# Patient Record
Sex: FEMALE | Race: BLACK OR AFRICAN AMERICAN | NOT HISPANIC OR LATINO | Employment: UNEMPLOYED | URBAN - METROPOLITAN AREA
[De-identification: names, ages, dates, MRNs, and addresses within clinical notes are randomized per-mention and may not be internally consistent; named-entity substitution may affect disease eponyms.]

---
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
## 2022-02-16 ENCOUNTER — OFFICE VISIT (OUTPATIENT)
Dept: PRIMARY CARE CLINIC | Facility: CLINIC | Age: 22
End: 2022-02-16
Payer: MEDICAID

## 2022-02-16 VITALS
RESPIRATION RATE: 18 BRPM | BODY MASS INDEX: 31.16 KG/M2 | WEIGHT: 187 LBS | HEART RATE: 65 BPM | SYSTOLIC BLOOD PRESSURE: 109 MMHG | OXYGEN SATURATION: 99 % | HEIGHT: 65 IN | DIASTOLIC BLOOD PRESSURE: 68 MMHG

## 2022-02-16 DIAGNOSIS — Z01.419 WELL WOMAN EXAM WITH ROUTINE GYNECOLOGICAL EXAM: ICD-10-CM

## 2022-02-16 DIAGNOSIS — K21.00 GASTROESOPHAGEAL REFLUX DISEASE WITH ESOPHAGITIS WITHOUT HEMORRHAGE: ICD-10-CM

## 2022-02-16 DIAGNOSIS — R35.0 URINE FREQUENCY: ICD-10-CM

## 2022-02-16 PROCEDURE — 1159F MED LIST DOCD IN RCRD: CPT | Mod: CPTII,S$GLB,, | Performed by: NURSE PRACTITIONER

## 2022-02-16 PROCEDURE — 1159F PR MEDICATION LIST DOCUMENTED IN MEDICAL RECORD: ICD-10-PCS | Mod: CPTII,S$GLB,, | Performed by: NURSE PRACTITIONER

## 2022-02-16 PROCEDURE — 3074F SYST BP LT 130 MM HG: CPT | Mod: CPTII,S$GLB,, | Performed by: NURSE PRACTITIONER

## 2022-02-16 PROCEDURE — 1160F PR REVIEW ALL MEDS BY PRESCRIBER/CLIN PHARMACIST DOCUMENTED: ICD-10-PCS | Mod: CPTII,S$GLB,, | Performed by: NURSE PRACTITIONER

## 2022-02-16 PROCEDURE — 3074F PR MOST RECENT SYSTOLIC BLOOD PRESSURE < 130 MM HG: ICD-10-PCS | Mod: CPTII,S$GLB,, | Performed by: NURSE PRACTITIONER

## 2022-02-16 PROCEDURE — 99214 OFFICE O/P EST MOD 30 MIN: CPT | Mod: S$GLB,,, | Performed by: NURSE PRACTITIONER

## 2022-02-16 PROCEDURE — 3078F PR MOST RECENT DIASTOLIC BLOOD PRESSURE < 80 MM HG: ICD-10-PCS | Mod: CPTII,S$GLB,, | Performed by: NURSE PRACTITIONER

## 2022-02-16 PROCEDURE — 3078F DIAST BP <80 MM HG: CPT | Mod: CPTII,S$GLB,, | Performed by: NURSE PRACTITIONER

## 2022-02-16 PROCEDURE — 99214 PR OFFICE/OUTPT VISIT, EST, LEVL IV, 30-39 MIN: ICD-10-PCS | Mod: S$GLB,,, | Performed by: NURSE PRACTITIONER

## 2022-02-16 PROCEDURE — 3008F BODY MASS INDEX DOCD: CPT | Mod: CPTII,S$GLB,, | Performed by: NURSE PRACTITIONER

## 2022-02-16 PROCEDURE — 3008F PR BODY MASS INDEX (BMI) DOCUMENTED: ICD-10-PCS | Mod: CPTII,S$GLB,, | Performed by: NURSE PRACTITIONER

## 2022-02-16 PROCEDURE — 1160F RVW MEDS BY RX/DR IN RCRD: CPT | Mod: CPTII,S$GLB,, | Performed by: NURSE PRACTITIONER

## 2022-02-16 NOTE — PROGRESS NOTES
Subjective:       Patient ID: Luciana Jacob is a 21 y.o. female.    Chief Complaint: Follow-up    20 y/o female in for F/U.    Had Nexplanon previously which she had removed in November by [facility] after having breast tenderness and nipple pain along with nausea. Since having it removed she is no longer having these symptoms. She is considering getting a birth control without hormones but needs a referral to see a GYN provider since she has been going to [facility] prior to now. Pregnancy test negative today in office. Requested pregnancy test today due to her having intercourse once since having her Nexplanon removed.    GERD - controlled well with Protonix daily. No longer having excessive burping or gas.     No other issues or concerns today.              History reviewed. No pertinent past medical history.    Past Surgical History:   Procedure Laterality Date    KNEE SURGERY Left 2017       Family History   Problem Relation Age of Onset    Cancer Paternal Grandmother        Social History     Tobacco Use    Smoking status: Never Smoker    Smokeless tobacco: Never Used   Substance Use Topics    Alcohol use: Not Currently    Drug use: Never       Patient Active Problem List   Diagnosis    Gastroesophageal reflux disease with esophagitis without hemorrhage       Immunization History   Administered Date(s) Administered    COVID-19, MRNA, LN-S, PF (Pfizer) (Purple Cap) 08/03/2021, 08/24/2021           Review of Systems   Constitutional: Negative for activity change, appetite change, chills, diaphoresis, fatigue and fever.   HENT: Negative for sinus pain, tinnitus and trouble swallowing.    Eyes: Negative for visual disturbance.   Respiratory: Negative for cough, chest tightness, shortness of breath and wheezing.    Cardiovascular: Negative for chest pain, palpitations and leg swelling.   Gastrointestinal: Negative for abdominal distention and abdominal pain.   Genitourinary: Negative for  "decreased urine volume, dysuria, frequency, hematuria and urgency.   Musculoskeletal: Negative for gait problem.   Skin: Negative for color change and rash.   Neurological: Negative for dizziness, syncope, weakness, light-headedness, numbness and headaches.   Psychiatric/Behavioral: Negative for behavioral problems and confusion.     Objective:     Vitals:    02/16/22 1612   BP: 109/68   BP Location: Left arm   Patient Position: Sitting   BP Method: Large (Automatic)   Pulse: 65   Resp: 18   SpO2: 99%   Weight: 84.8 kg (187 lb)   Height: 5' 5" (1.651 m)       Physical Exam  Vitals and nursing note reviewed.   Constitutional:       Appearance: Normal appearance.   HENT:      Head: Normocephalic and atraumatic.      Nose: Nose normal.      Mouth/Throat:      Mouth: Mucous membranes are moist.      Pharynx: Oropharynx is clear.   Eyes:      Extraocular Movements: Extraocular movements intact.      Conjunctiva/sclera: Conjunctivae normal.      Pupils: Pupils are equal, round, and reactive to light.   Cardiovascular:      Rate and Rhythm: Normal rate and regular rhythm.      Pulses: Normal pulses.      Heart sounds: Normal heart sounds.   Pulmonary:      Effort: Pulmonary effort is normal.      Breath sounds: Normal breath sounds.   Abdominal:      General: There is no distension.      Tenderness: There is no abdominal tenderness.   Musculoskeletal:         General: Normal range of motion.      Cervical back: Normal range of motion and neck supple.      Right lower leg: No edema.      Left lower leg: No edema.   Skin:     General: Skin is warm and dry.   Neurological:      Mental Status: She is alert and oriented to person, place, and time.   Psychiatric:         Mood and Affect: Mood and affect normal.         Behavior: Behavior normal. Behavior is cooperative.         Office Visit on 11/16/2021   Component Date Value Ref Range Status    Glucose 11/16/2021 74  65 - 139 mg/dL Final    BUN 11/16/2021 6* 7 - 20 mg/dL " Final    Creatinine 11/16/2021 0.66  0.50 - 1.00 mg/dL Final    eGFR if non  11/16/2021 128  > OR = 60 mL/min/1.73m2 Final    eGFR if  11/16/2021 148  > OR = 60 mL/min/1.73m2 Final    BUN/Creatinine Ratio 11/16/2021 9  6 - 22 (calc) Final    Sodium 11/16/2021 140  135 - 146 mmol/L Final    Potassium 11/16/2021 4.1  3.8 - 5.1 mmol/L Final    Chloride 11/16/2021 104  98 - 110 mmol/L Final    CO2 11/16/2021 31  20 - 32 mmol/L Final    Calcium 11/16/2021 9.6  8.9 - 10.4 mg/dL Final    Total Protein 11/16/2021 7.1  6.3 - 8.2 g/dL Final    Albumin 11/16/2021 4.3  3.6 - 5.1 g/dL Final    Globulin, Total 11/16/2021 2.8  2.0 - 3.8 g/dL (calc) Final    Albumin/Globulin Ratio 11/16/2021 1.5  1.0 - 2.5 (calc) Final    Total Bilirubin 11/16/2021 0.5  0.2 - 1.1 mg/dL Final    Alkaline Phosphatase 11/16/2021 71  36 - 128 U/L Final    AST 11/16/2021 15  12 - 32 U/L Final    ALT 11/16/2021 12  5 - 32 U/L Final    HCG, Total, QL 11/16/2021 NEGATIVE  See Note: Final    Color, UA 11/16/2021 YELLOW  YELLOW Final    Appearance, UA 11/16/2021 CLEAR  CLEAR Final    Specific [place], UA 11/16/2021 1.017  1.001 - 1.035 Final    pH, UA 11/16/2021 6.0  5.0 - 8.0 Final    Glucose, UA 11/16/2021 NEGATIVE  NEGATIVE Final    Bilirubin, UA 11/16/2021 NEGATIVE  NEGATIVE Final    Ketones, UA 11/16/2021 NEGATIVE  NEGATIVE Final    Occult Blood UA 11/16/2021 NEGATIVE  NEGATIVE Final    Protein, UA 11/16/2021 NEGATIVE  NEGATIVE Final    Nitrite, UA 11/16/2021 NEGATIVE  NEGATIVE Final    Leukocytes, UA 11/16/2021 1+* NEGATIVE Final    WBC Casts, UA 11/16/2021 6-10* < OR = 5 /HPF Final    RBC Casts, UA 11/16/2021 0-2  < OR = 2 /HPF Final    Squam Epithel, UA 11/16/2021 0-5  < OR = 5 /HPF Final    Bacteria, UA 11/16/2021 NONE SEEN  NONE SEEN /HPF Final    Hyaline Casts, UA 11/16/2021 NONE SEEN  NONE SEEN /LPF Final    Urine Culture, Routine 11/16/2021 *  Final         Assessment:      1.  Gastroesophageal reflux disease with esophagitis without hemorrhage    2. Urine frequency    3. Well woman exam with routine gynecological exam          Plan:     Gastroesophageal reflux disease with esophagitis without hemorrhage  Comments:  continue pantoprazole daily, avoid large meals and lying down after meals.    Urine frequency  Comments:  Pregnancy test negative today in office  Orders:  -     POCT urine pregnancy    Well woman exam with routine gynecological exam  Comments:  Referred to GYN for well woman exam  Orders:  -     Ambulatory referral/consult to Obstetrics / Gynecology; Future; Expected date: 02/23/2022         Current Outpatient Medications   Medication Sig Dispense Refill    pantoprazole (PROTONIX) 20 MG tablet Take 1 tablet (20 mg total) by mouth once daily. 30 tablet 2     No current facility-administered medications for this visit.       Medications Discontinued During This Encounter   Medication Reason    nitrofurantoin, macrocrystal-monohydrate, (MACROBID) 100 MG capsule Patient no longer taking       Health Maintenance   Topic Date Due    HPV Vaccines (1 - 2-dose series) Never done    Hepatitis C Screening  07/12/2022 (Originally 2002)    TETANUS VACCINE  07/12/2022 (Originally 9/5/2020)    Lipid Panel  Completed       Patient Instructions     RTC in 6 months for F/U or sooner if needed.    Keep appts with specialists as scheduled.    Instructed patient to report to nearest ER or call 911 if begins to have difficulty breathing, turning blue, chest pain, B/P < 80/60 or >170/100, palpitations, syncope, extreme weakness, or severe H/A. Patient verbalized understanding.     Patient Education       Birth Control Options   About this topic   Birth control is also known as contraception. It is a way for people to avoid pregnancy. There are many kinds of birth control. It is important to learn about them to decide the best one for you to use.  General   Talk to your partner and your  doctor. Together, you can choose the best kind of birth control for you. It is important to think about:  · Your health  · Protection from sexually-transmitted diseases (STDs)  · How often you have sex  · If you have more than one sex partner  · Your wish to have children in the future  There is only one way to fully avoid pregnancy. That is by not having sex. This is called abstinence. It also protects you from STDs.  What Is The Best Birth Control Method for Me?   Barrier methods: Use a block or barrier to keep sperm from reaching the egg. Condoms give you the most protection from STDs. Spermicides and other methods do not protect you from STDs.  · External condom ? Also known as a male condom. It is a thin film cover placed over the penis before sex. It is used only once.  · Internal condom - Also known as a female condom. It is a lubricated thin pouch that is placed into the vagina before sex. It may be only used once.  · Spermicide ? A foam, cream, jelly, film, or tablet that kills sperm.  · Diaphragm with spermicide ? A dome-shaped flexible disc and rim. It covers the cervix so the sperm cannot reach the egg. It is used with a spermicide and put in place before sex.  · Sponge with spermicide ? A disc-shaped device with spermicide that covers the cervix. It is put in place before sex.  · Cervical cap with spermicide ? A soft latex or silicon cup with a round rim that fits over the cervix. It is put in place before sex.  · Non-hormonal gel - Lowers the pH in the vagina and helps stop sperm from reaching the egg. It is used before sex. Must be repeated each time before you have sex.  Hormonal methods: These drugs give you hormones that keep the ovary from releasing eggs to be fertilized. This method does not protect you from STDs.  · Oral contraceptive pill ? A pill that you take each day that gives you hormones. It also thickens the cervical mucus to keep sperm from reaching the egg.  · Patch ? A weekly skin  patch that contains hormones. It is worn on your lower belly, buttocks, or upper body.  · Vaginal ring ? A flexible ring that contains hormones. You put the ring into your vagina once a month. The ring stays in place for 3 weeks and then you remove it for 1 week.  · Shot or Injection ? A shot that gives you hormones over 3 months. It also thickens the cervical mucus that keeps the sperm from reaching the egg. You must repeat the shot every 3 months to have continued protection.  Emergency contraceptive: This may be used if you did not use birth control before sex or if your normal birth control failed. It should not be used as a regular form of birth control. This method does not protect you from STDs.  · Morning after pill ? One or two pills with hormones. This works the same as other birth control pills. You can use it after you have sex without a condom or without the use of any birth control. It is also used if your birth control did not work or you are forced to have sex.  Implanted devices: These are put into the body to stop eggs from being fertilized. They can be kept in place for a few years. This method does not protect you from STDs.  · Intrauterine device (IUD) ? A T-shaped, flexible device that contains hormones. It is put into your uterus (womb) by the doctor.  · Implantable janet ? A matchstick-sized janet that contains hormones. It is put in under the skin of your upper arm.  Permanent methods: Surgery for people who do not want children in the future. If you have a uterus, you will not be able to get pregnant. If you produce sperm, you will not be able to get a person pregnant. Permanent methods do not protect you from STDs.  · Sterilization surgery:  ? Tubal ligation ? The fallopian tubes are closed off so the sperm cannot reach the egg.  ? Vasectomy ? The tube that carries the sperm is blocked. The fluid that comes out during ejaculation does not have sperm in it.  What will the results be?    Pregnancy and sexually transmitted diseases or STDs may be avoided. The option you choose will only work if you use it the right way and use it each time you have sex.  Where can I learn more?   FamilyDoctor.org  http://familydoctor.org/familydoctor/en/prevention-wellness/sex-birth-control/birth-control/birth-control-options.html   FDA Office of Women's Health  https://www.fda.gov/consumers/free-publications-women/birth-control   NHS Choices  https://www.nhs.uk/conditions/contraception/?   Last Reviewed Date   2021-07-08  Consumer Information Use and Disclaimer   This information is not specific medical advice and does not replace information you receive from your health care provider. This is only a brief summary of general information. It does NOT include all information about conditions, illnesses, injuries, tests, procedures, treatments, therapies, discharge instructions or life-style choices that may apply to you. You must talk with your health care provider for complete information about your health and treatment options. This information should not be used to decide whether or not to accept your health care providers advice, instructions or recommendations. Only your health care provider has the knowledge and training to provide advice that is right for you.  Copyright   Copyright © 2021 UpToDate, Inc. and its affiliates and/or licensors. All rights reserved.  Patient Education       STD Prevention   About this topic   Sexually-transmitted diseases or STDs are infections you catch during sexual contact. This includes vaginal, oral, and anal sex. You may also get it by coming in contact with skin, genitals, mouth, rectum, or body fluids of an infected person. A person with an STD may not have any signs or know they have it. STDs can be very serious and have long-term health effects. STDs can cause cancer, blindness, or not being able to have children.  Bacteria, viruses, or parasites can cause STDs.  Bacterial STDs are treated with antibiotics. Only the signs of viral STDs are treated. Common STDs include:  · Chlamydia   · Gonorrhea  · Syphilis  · Human immunodeficiency virus (HIV)  · Herpes simplex  · Human papillomavirus (HPV)  · Hepatitis B and C  · Trichomonas  STDs may cause signs like:  · Pain when passing urine  · Sores or genital warts  · Unusual discharge from penis or vagina  · Itching on your inner thighs, anus, or genitals  · Abnormal menstrual bleeding  · Pain or bleeding during sex  · Swelling of testicles  · Fever  · Muscle or joint pain  These signs may come and go. It is important to see your doctor even if you think the signs are gone.  General   · Learn about your health, your body, sex, love, and relationships. These are all important parts of sexual health.  · Learn about STDs. Find reliable information about STDs.  · Know the right names for both male and female body parts.  · Find information about the kinds of infections you could get.  · Know how STDs spread as well as the signs and treatment.     What will the results be?   · You may be able to protect yourself from getting STDs.  · You may be able to prevent long-term effects on your health.  · If you are pregnant, you may be able to prevent giving your unborn baby a STD.  Will there be any other care needed?   · Ask your doctor if there are shots or pills you can take to prevent a STD.  · Know your STD status. Get tested and have your partner tested.  What can be done to prevent this health problem?   · The only sure way to keep from getting or passing on a sexually-transmitted infection is to not have sexual contact with anyone.  · Even if you do not have any signs of illness, you may spread an STD.  · Having an STD can increase your chances of catching HIV, the virus that causes AIDS.  · If you have any type of sexual contact, use latex condoms each time to reduce the spread of infection. Use a condom with each partner every time,  from the start of sex to the end.  · Avoid contact with any sex partner known to have an infection or who has signs of an infection like genital sores or warts.  · Avoid multiple sex partners. A long-term monogamous relationship with a partner who has tested negative and is known to have no infection is the safest partner.  · If you are pregnant, get tested and get prompt treatment for an STD. This will help avoid passing it to your baby.  · Avoid using drugs or too much alcohol. Either of these can lead to risky behavior like unprotected sex.  · Talk to your partner about STDs before you have sex. Talk about having safe sex and if your relationship is monogamous.  · Wash your hands and genitals with soap and water after sex.  · See your doctor for regular exams and check-ups for STDs.  · Talk to your doctor about available vaccines for prevention of certain STDs.  Where can I learn more?   American Academy of Family Physicians  http://familydoctor.org/familydoctor/en/diseases-conditions/sexually-transmitted-infections/prevention.printerview.all.html   Centers for Disease Control  http://www.cdc.gov/std/prevention/default.htm   Last Reviewed Date   2021-03-31  Consumer Information Use and Disclaimer   This information is not specific medical advice and does not replace information you receive from your health care provider. This is only a brief summary of general information. It does NOT include all information about conditions, illnesses, injuries, tests, procedures, treatments, therapies, discharge instructions or life-style choices that may apply to you. You must talk with your health care provider for complete information about your health and treatment options. This information should not be used to decide whether or not to accept your health care providers advice, instructions or recommendations. Only your health care provider has the knowledge and training to provide advice that is right for you.  Copyright  "  Copyright © 2021 UpToDate, Inc. and its affiliates and/or licensors. All rights reserved.      Patient Education       Acid Reflux and Gastroesophageal Reflux Disease in Adults   The Basics   Written by the doctors and editors at [employer]   What is acid reflux? -- Acid reflux is when the acid that is normally in your stomach backs up into the esophagus. The esophagus is the tube that carries food from your mouth to your stomach (figure 1).  When acid reflux causes bothersome symptoms or damage, doctors call it "gastroesophageal reflux disease" or "GERD."  What are the symptoms of acid reflux? -- The most common symptoms are:  · Heartburn, which is a burning feeling in the chest  · Regurgitation, which is when acid and undigested food flow back into your throat or mouth   Other symptoms might include:  · Stomach or chest pain  · Trouble swallowing  · Having a raspy voice or a sore throat  · Unexplained cough  · Nausea or vomiting  Is there anything I can do on my own to feel better? -- Yes. You might feel better if you:  · Lose weight (if you are overweight)  · Raise the head of your bed by 6 to 8 inches - You can do this by putting blocks of wood or rubber under 2 legs of the bed or a foam wedge under the mattress.  · Avoid foods that make your symptoms worse - For some people these include coffee, chocolate, alcohol, peppermint, and fatty foods.  · Stop smoking, if you smoke  · Avoid late meals - Lying down with a full stomach can make reflux worse. Try to plan meals for at least 2 to 3 hours before bedtime.  · Avoid tight clothing - Some people feel better if they wear comfortable clothing that does not squeeze the stomach area.   How is acid reflux treated? -- There are a few main types of medicines that can help with the symptoms of acid reflux. The most common are antacids, histamine blockers, and proton pump inhibitors (table 1). All of these medicines work by reducing or blocking stomach acid. But they each " "do that in a different way.  · For mild symptoms, antacids can help, but they work only for a short time. Histamine blockers are stronger and last longer than antacids. You can buy antacids and most histamine blockers without a prescription.  · For frequent and more severe symptoms, proton pump inhibitors are the most effective medicines. Some of these medicines are sold without a prescription. But there are other versions that your doctor can prescribe.  Sometimes, medicines cost less if you get them with a doctor's prescription. Other times, non-prescription medicines cost less. If you are worried about cost, ask your pharmacist about ways to pay less for your medicines.  Should I see a doctor or nurse about my acid reflux? -- Some people can manage their acid reflux on their own by changing their habits or taking non-prescription medicines. But you should see a doctor or nurse if:  · Your symptoms are severe or last a long time  · You cannot seem to control your symptoms  · You have had symptoms for many years  You should also see a doctor or nurse right away if you:  · Have trouble swallowing, or feel as though food gets "stuck" on the way down  · Lose weight when you are not trying to  · Have chest pain  · Choke when you eat  · Vomit blood or have bowel movements that are red, black, or look like tar  What if my child or teenager has acid reflux? -- If your child or teenager has acid reflux, take him or her to see a doctor or nurse. Do not give your child medicines to treat acid reflux without talking to a doctor or nurse.  In children, acid reflux can be caused by a number of problems. It's important to have a doctor or nurse check for these problems before trying any treatments.  All topics are updated as new evidence becomes available and our peer review process is complete.  This topic retrieved from [employer] on: Sep 21, 2021.  Topic 58521 Version 11.0  Release: 29.4.2 - C29.263  © 2021 UpToDate, Inc. " and/or its affiliates. All rights reserved.  figure 1: Upper digestive tract     The upper digestive tract includes the esophagus (the tube that connects the mouth to the stomach), the stomach, and the duodenum (the first part of the small intestine).  Graphic 73925 Version 6.0    table 1: Medicines used to reduce stomach acid  Medicine type  Medicine name examples    Antacids* Calcium carbonate (sample brand names: Maalox, Tums)    Aluminum hydroxide, magnesium hydroxide, and simethicone (sample brand name: Mylanta)   Surface agents Sucralfate (brand name: Carafate)   Histamine blockers¶  Famotidine (brand name: Pepcid)    Cimetidine (brand name: Tagamet)   Proton pump inhibitors Omeprazole (brand name: Prilosec)    Esomeprazole (brand name: Nexium)    Pantoprazole (brand name: Protonix)    Lansoprazole (brand name: Prevacid)    Dexlansoprazole (brand name: Dexilant)    Rabeprazole (brand name: AcipHex)   Graphic 62791 Version 14.0  Consumer Information Use and Disclaimer   This information is not specific medical advice and does not replace information you receive from your health care provider. This is only a brief summary of general information. It does NOT include all information about conditions, illnesses, injuries, tests, procedures, treatments, therapies, discharge instructions or life-style choices that may apply to you. You must talk with your health care provider for complete information about your health and treatment options. This information should not be used to decide whether or not to accept your health care provider's advice, instructions or recommendations. Only your health care provider has the knowledge and training to provide advice that is right for you. The use of this information is governed by the [employer] End User License Agreement, available at [email or web address].The use of UpToDate content is governed by the [employer] Terms of Use. ©2021  UpToDate, Inc. All rights reserved.  Copyright   © 2021 UpToDate, Inc. and/or its affiliates. All rights reserved.        Risks, benefits, and alternatives discussed with patient, Patient verbalized understanding of discussed plan of care. Asked patient if any further questions, answered no.    Future Appointments   Date Time Provider Department Center   8/17/2022  4:00 PM [facility]

## 2022-02-16 NOTE — PATIENT INSTRUCTIONS
RTC in 6 months for F/U or sooner if needed.    Keep appts with specialists as scheduled.    Instructed patient to report to nearest ER or call 911 if begins to have difficulty breathing, turning blue, chest pain, B/P < 80/60 or >170/100, palpitations, syncope, extreme weakness, or severe H/A. Patient verbalized understanding.     Patient Education       Birth Control Options   About this topic   Birth control is also known as contraception. It is a way for people to avoid pregnancy. There are many kinds of birth control. It is important to learn about them to decide the best one for you to use.  General   Talk to your partner and your doctor. Together, you can choose the best kind of birth control for you. It is important to think about:  · Your health  · Protection from sexually-transmitted diseases (STDs)  · How often you have sex  · If you have more than one sex partner  · Your wish to have children in the future  There is only one way to fully avoid pregnancy. That is by not having sex. This is called abstinence. It also protects you from STDs.  What Is The Best Birth Control Method for Me?   Barrier methods: Use a block or barrier to keep sperm from reaching the egg. Condoms give you the most protection from STDs. Spermicides and other methods do not protect you from STDs.  · External condom ? Also known as a male condom. It is a thin film cover placed over the penis before sex. It is used only once.  · Internal condom - Also known as a female condom. It is a lubricated thin pouch that is placed into the vagina before sex. It may be only used once.  · Spermicide ? A foam, cream, jelly, film, or tablet that kills sperm.  · Diaphragm with spermicide ? A dome-shaped flexible disc and rim. It covers the cervix so the sperm cannot reach the egg. It is used with a spermicide and put in place before sex.  · Sponge with spermicide ? A disc-shaped device with spermicide that covers the cervix. It is put in place before  sex.  · Cervical cap with spermicide ? A soft latex or silicon cup with a round rim that fits over the cervix. It is put in place before sex.  · Non-hormonal gel - Lowers the pH in the vagina and helps stop sperm from reaching the egg. It is used before sex. Must be repeated each time before you have sex.  Hormonal methods: These drugs give you hormones that keep the ovary from releasing eggs to be fertilized. This method does not protect you from STDs.  · Oral contraceptive pill ? A pill that you take each day that gives you hormones. It also thickens the cervical mucus to keep sperm from reaching the egg.  · Patch ? A weekly skin patch that contains hormones. It is worn on your lower belly, buttocks, or upper body.  · Vaginal ring ? A flexible ring that contains hormones. You put the ring into your vagina once a month. The ring stays in place for 3 weeks and then you remove it for 1 week.  · Shot or Injection ? A shot that gives you hormones over 3 months. It also thickens the cervical mucus that keeps the sperm from reaching the egg. You must repeat the shot every 3 months to have continued protection.  Emergency contraceptive: This may be used if you did not use birth control before sex or if your normal birth control failed. It should not be used as a regular form of birth control. This method does not protect you from STDs.  · Morning after pill ? One or two pills with hormones. This works the same as other birth control pills. You can use it after you have sex without a condom or without the use of any birth control. It is also used if your birth control did not work or you are forced to have sex.  Implanted devices: These are put into the body to stop eggs from being fertilized. They can be kept in place for a few years. This method does not protect you from STDs.  · Intrauterine device (IUD) ? A T-shaped, flexible device that contains hormones. It is put into your uterus (womb) by the doctor.  · Implantable  janet ? A matchstick-sized janet that contains hormones. It is put in under the skin of your upper arm.  Permanent methods: Surgery for people who do not want children in the future. If you have a uterus, you will not be able to get pregnant. If you produce sperm, you will not be able to get a person pregnant. Permanent methods do not protect you from STDs.  · Sterilization surgery:  ? Tubal ligation ? The fallopian tubes are closed off so the sperm cannot reach the egg.  ? Vasectomy ? The tube that carries the sperm is blocked. The fluid that comes out during ejaculation does not have sperm in it.  What will the results be?   Pregnancy and sexually transmitted diseases or STDs may be avoided. The option you choose will only work if you use it the right way and use it each time you have sex.  Where can I learn more?   FamilyDoctor.org  http://familydoctor.org/familydoctor/en/prevention-wellness/sex-birth-control/birth-control/birth-control-options.html   FDA Office of Women's Health  https://www.fda.gov/consumers/free-publications-women/birth-control   NHS Choices  https://www.nhs.uk/conditions/contraception/?   Last Reviewed Date   2021-07-08  Consumer Information Use and Disclaimer   This information is not specific medical advice and does not replace information you receive from your health care provider. This is only a brief summary of general information. It does NOT include all information about conditions, illnesses, injuries, tests, procedures, treatments, therapies, discharge instructions or life-style choices that may apply to you. You must talk with your health care provider for complete information about your health and treatment options. This information should not be used to decide whether or not to accept your health care providers advice, instructions or recommendations. Only your health care provider has the knowledge and training to provide advice that is right for you.  Copyright   Copyright © 2021  UpToDate, Inc. and its affiliates and/or licensors. All rights reserved.  Patient Education       STD Prevention   About this topic   Sexually-transmitted diseases or STDs are infections you catch during sexual contact. This includes vaginal, oral, and anal sex. You may also get it by coming in contact with skin, genitals, mouth, rectum, or body fluids of an infected person. A person with an STD may not have any signs or know they have it. STDs can be very serious and have long-term health effects. STDs can cause cancer, blindness, or not being able to have children.  Bacteria, viruses, or parasites can cause STDs. Bacterial STDs are treated with antibiotics. Only the signs of viral STDs are treated. Common STDs include:  · Chlamydia   · Gonorrhea  · Syphilis  · Human immunodeficiency virus (HIV)  · Herpes simplex  · Human papillomavirus (HPV)  · Hepatitis B and C  · Trichomonas  STDs may cause signs like:  · Pain when passing urine  · Sores or genital warts  · Unusual discharge from penis or vagina  · Itching on your inner thighs, anus, or genitals  · Abnormal menstrual bleeding  · Pain or bleeding during sex  · Swelling of testicles  · Fever  · Muscle or joint pain  These signs may come and go. It is important to see your doctor even if you think the signs are gone.  General   · Learn about your health, your body, sex, love, and relationships. These are all important parts of sexual health.  · Learn about STDs. Find reliable information about STDs.  · Know the right names for both male and female body parts.  · Find information about the kinds of infections you could get.  · Know how STDs spread as well as the signs and treatment.     What will the results be?   · You may be able to protect yourself from getting STDs.  · You may be able to prevent long-term effects on your health.  · If you are pregnant, you may be able to prevent giving your unborn baby a STD.  Will there be any other care needed?   · Ask your  doctor if there are shots or pills you can take to prevent a STD.  · Know your STD status. Get tested and have your partner tested.  What can be done to prevent this health problem?   · The only sure way to keep from getting or passing on a sexually-transmitted infection is to not have sexual contact with anyone.  · Even if you do not have any signs of illness, you may spread an STD.  · Having an STD can increase your chances of catching HIV, the virus that causes AIDS.  · If you have any type of sexual contact, use latex condoms each time to reduce the spread of infection. Use a condom with each partner every time, from the start of sex to the end.  · Avoid contact with any sex partner known to have an infection or who has signs of an infection like genital sores or warts.  · Avoid multiple sex partners. A long-term monogamous relationship with a partner who has tested negative and is known to have no infection is the safest partner.  · If you are pregnant, get tested and get prompt treatment for an STD. This will help avoid passing it to your baby.  · Avoid using drugs or too much alcohol. Either of these can lead to risky behavior like unprotected sex.  · Talk to your partner about STDs before you have sex. Talk about having safe sex and if your relationship is monogamous.  · Wash your hands and genitals with soap and water after sex.  · See your doctor for regular exams and check-ups for STDs.  · Talk to your doctor about available vaccines for prevention of certain STDs.  Where can I learn more?   American Academy of Family Physicians  http://familydoctor.org/familydoctor/en/diseases-conditions/sexually-transmitted-infections/prevention.printerview.all.html   Centers for Disease Control  http://www.cdc.gov/std/prevention/default.htm   Last Reviewed Date   2021-03-31  Consumer Information Use and Disclaimer   This information is not specific medical advice and does not replace information you receive from your  "health care provider. This is only a brief summary of general information. It does NOT include all information about conditions, illnesses, injuries, tests, procedures, treatments, therapies, discharge instructions or life-style choices that may apply to you. You must talk with your health care provider for complete information about your health and treatment options. This information should not be used to decide whether or not to accept your health care providers advice, instructions or recommendations. Only your health care provider has the knowledge and training to provide advice that is right for you.  Copyright   Copyright © 2021 UpToDate, Inc. and its affiliates and/or licensors. All rights reserved.      Patient Education       Acid Reflux and Gastroesophageal Reflux Disease in Adults   The Basics   Written by the doctors and editors at [employer]   What is acid reflux? -- Acid reflux is when the acid that is normally in your stomach backs up into the esophagus. The esophagus is the tube that carries food from your mouth to your stomach (figure 1).  When acid reflux causes bothersome symptoms or damage, doctors call it "gastroesophageal reflux disease" or "GERD."  What are the symptoms of acid reflux? -- The most common symptoms are:  · Heartburn, which is a burning feeling in the chest  · Regurgitation, which is when acid and undigested food flow back into your throat or mouth   Other symptoms might include:  · Stomach or chest pain  · Trouble swallowing  · Having a raspy voice or a sore throat  · Unexplained cough  · Nausea or vomiting  Is there anything I can do on my own to feel better? -- Yes. You might feel better if you:  · Lose weight (if you are overweight)  · Raise the head of your bed by 6 to 8 inches - You can do this by putting blocks of wood or rubber under 2 legs of the bed or a foam wedge under the mattress.  · Avoid foods that make your symptoms worse - For some people these include coffee, " "chocolate, alcohol, peppermint, and fatty foods.  · Stop smoking, if you smoke  · Avoid late meals - Lying down with a full stomach can make reflux worse. Try to plan meals for at least 2 to 3 hours before bedtime.  · Avoid tight clothing - Some people feel better if they wear comfortable clothing that does not squeeze the stomach area.   How is acid reflux treated? -- There are a few main types of medicines that can help with the symptoms of acid reflux. The most common are antacids, histamine blockers, and proton pump inhibitors (table 1). All of these medicines work by reducing or blocking stomach acid. But they each do that in a different way.  · For mild symptoms, antacids can help, but they work only for a short time. Histamine blockers are stronger and last longer than antacids. You can buy antacids and most histamine blockers without a prescription.  · For frequent and more severe symptoms, proton pump inhibitors are the most effective medicines. Some of these medicines are sold without a prescription. But there are other versions that your doctor can prescribe.  Sometimes, medicines cost less if you get them with a doctor's prescription. Other times, non-prescription medicines cost less. If you are worried about cost, ask your pharmacist about ways to pay less for your medicines.  Should I see a doctor or nurse about my acid reflux? -- Some people can manage their acid reflux on their own by changing their habits or taking non-prescription medicines. But you should see a doctor or nurse if:  · Your symptoms are severe or last a long time  · You cannot seem to control your symptoms  · You have had symptoms for many years  You should also see a doctor or nurse right away if you:  · Have trouble swallowing, or feel as though food gets "stuck" on the way down  · Lose weight when you are not trying to  · Have chest pain  · Choke when you eat  · Vomit blood or have bowel movements that are red, black, or look " like tar  What if my child or teenager has acid reflux? -- If your child or teenager has acid reflux, take him or her to see a doctor or nurse. Do not give your child medicines to treat acid reflux without talking to a doctor or nurse.  In children, acid reflux can be caused by a number of problems. It's important to have a doctor or nurse check for these problems before trying any treatments.  All topics are updated as new evidence becomes available and our peer review process is complete.  This topic retrieved from [email or web address] on: Sep 21, 2021.  Topic 14784 Version 11.0  Release: 29.4.2 - C29.263  © 2021 UpToDate, Inc. and/or its affiliates. All rights reserved.  figure 1: Upper digestive tract     The upper digestive tract includes the esophagus (the tube that connects the mouth to the stomach), the stomach, and the duodenum (the first part of the small intestine).  Graphic 88015 Version 6.0    table 1: Medicines used to reduce stomach acid  Medicine type  Medicine name examples    Antacids* Calcium carbonate (sample brand names: Maalox, Tums)    Aluminum hydroxide, magnesium hydroxide, and simethicone (sample brand name: Mylanta)   Surface agents Sucralfate (brand name: Carafate)   Histamine blockers¶  Famotidine (brand name: Pepcid)    Cimetidine (brand name: Tagamet)   Proton pump inhibitors Omeprazole (brand name: Prilosec)    Esomeprazole (brand name: Nexium)    Pantoprazole (brand name: Protonix)    Lansoprazole (brand name: Prevacid)    Dexlansoprazole (brand name: Dexilant)    Rabeprazole (brand name: AcipHex)   Graphic 13920 Version 14.0  Consumer Information Use and Disclaimer   This information is not specific medical advice and does not replace information you receive from your health care provider. This is only a brief summary of general information. It does NOT include all information about conditions, illnesses, injuries, tests, procedures, treatments, therapies, discharge instructions or life-style  choices that may apply to you. You must talk with your health care provider for complete information about your health and treatment options. This information should not be used to decide whether or not to accept your health care provider's advice, instructions or recommendations. Only your health care provider has the knowledge and training to provide advice that is right for you. The use of this information is governed by the [employer] End User License Agreement, available at https://www.[employer]/en/solutions/[employer]uan[employer]/about/annalisa.The use of [employer] content is governed by the [employer] Terms of Use. ©2021 "[employer] Inc. All rights reserved.  Copyright   © 2021 [employer] and/or its affiliates. All rights reserved.

## 2022-03-08 DIAGNOSIS — N92.6 MENSTRUAL PERIOD LATE: ICD-10-CM

## 2022-03-08 DIAGNOSIS — R35.0 URINE FREQUENCY: Primary | ICD-10-CM

## 2022-03-09 ENCOUNTER — TELEPHONE (OUTPATIENT)
Dept: PRIMARY CARE CLINIC | Facility: CLINIC | Age: 22
End: 2022-03-09
Payer: MEDICAID

## 2022-03-09 LAB — B-HCG SERPL QL: POSITIVE

## 2022-03-09 NOTE — PROGRESS NOTES
Please notify patient her pregnancy test is positive so she needs to notify her OB/GYN provider to get in for an evaluation.

## 2022-03-09 NOTE — TELEPHONE ENCOUNTER
----- Message from Yane Leiva NP sent at 3/9/2022  6:54 AM CST -----  Please notify patient her pregnancy test is positive so she needs to notify her OB/GYN provider to get in for an evaluation.

## 2022-03-16 ENCOUNTER — PROCEDURE VISIT (OUTPATIENT)
Dept: OBSTETRICS AND GYNECOLOGY | Facility: CLINIC | Age: 22
End: 2022-03-16
Payer: MEDICAID

## 2022-03-16 DIAGNOSIS — Z34.01 ENCOUNTER FOR SUPERVISION OF NORMAL FIRST PREGNANCY IN FIRST TRIMESTER: ICD-10-CM

## 2022-03-16 DIAGNOSIS — Z34.01 ENCOUNTER FOR SUPERVISION OF NORMAL FIRST PREGNANCY IN FIRST TRIMESTER: Primary | ICD-10-CM

## 2022-03-16 PROCEDURE — 76801 US OB/GYN PROCEDURE (VIEWPOINT): ICD-10-PCS | Mod: S$GLB,,, | Performed by: OBSTETRICS & GYNECOLOGY

## 2022-03-16 PROCEDURE — 76801 OB US < 14 WKS SINGLE FETUS: CPT | Mod: S$GLB,,, | Performed by: OBSTETRICS & GYNECOLOGY

## 2022-03-22 ENCOUNTER — OFFICE VISIT (OUTPATIENT)
Dept: OBSTETRICS AND GYNECOLOGY | Facility: CLINIC | Age: 22
End: 2022-03-22
Payer: MEDICAID

## 2022-03-22 VITALS
DIASTOLIC BLOOD PRESSURE: 75 MMHG | BODY MASS INDEX: 31.49 KG/M2 | HEART RATE: 70 BPM | HEIGHT: 65 IN | WEIGHT: 189 LBS | SYSTOLIC BLOOD PRESSURE: 121 MMHG

## 2022-03-22 DIAGNOSIS — Z32.01 POSITIVE PREGNANCY TEST: Primary | ICD-10-CM

## 2022-03-22 DIAGNOSIS — Z32.01 POSITIVE PREGNANCY TEST: ICD-10-CM

## 2022-03-22 PROCEDURE — 99203 PR OFFICE/OUTPT VISIT, NEW, LEVL III, 30-44 MIN: ICD-10-PCS | Mod: TH,S$GLB,, | Performed by: NURSE PRACTITIONER

## 2022-03-22 PROCEDURE — 99203 OFFICE O/P NEW LOW 30 MIN: CPT | Mod: TH,S$GLB,, | Performed by: NURSE PRACTITIONER

## 2022-03-22 PROCEDURE — 3074F SYST BP LT 130 MM HG: CPT | Mod: CPTII,S$GLB,, | Performed by: NURSE PRACTITIONER

## 2022-03-22 PROCEDURE — 3078F PR MOST RECENT DIASTOLIC BLOOD PRESSURE < 80 MM HG: ICD-10-PCS | Mod: CPTII,S$GLB,, | Performed by: NURSE PRACTITIONER

## 2022-03-22 PROCEDURE — 3074F PR MOST RECENT SYSTOLIC BLOOD PRESSURE < 130 MM HG: ICD-10-PCS | Mod: CPTII,S$GLB,, | Performed by: NURSE PRACTITIONER

## 2022-03-22 PROCEDURE — 1159F MED LIST DOCD IN RCRD: CPT | Mod: CPTII,S$GLB,, | Performed by: NURSE PRACTITIONER

## 2022-03-22 PROCEDURE — 3008F PR BODY MASS INDEX (BMI) DOCUMENTED: ICD-10-PCS | Mod: CPTII,S$GLB,, | Performed by: NURSE PRACTITIONER

## 2022-03-22 PROCEDURE — 3008F BODY MASS INDEX DOCD: CPT | Mod: CPTII,S$GLB,, | Performed by: NURSE PRACTITIONER

## 2022-03-22 PROCEDURE — 1159F PR MEDICATION LIST DOCUMENTED IN MEDICAL RECORD: ICD-10-PCS | Mod: CPTII,S$GLB,, | Performed by: NURSE PRACTITIONER

## 2022-03-22 PROCEDURE — 1160F PR REVIEW ALL MEDS BY PRESCRIBER/CLIN PHARMACIST DOCUMENTED: ICD-10-PCS | Mod: CPTII,S$GLB,, | Performed by: NURSE PRACTITIONER

## 2022-03-22 PROCEDURE — 3078F DIAST BP <80 MM HG: CPT | Mod: CPTII,S$GLB,, | Performed by: NURSE PRACTITIONER

## 2022-03-22 PROCEDURE — 1160F RVW MEDS BY RX/DR IN RCRD: CPT | Mod: CPTII,S$GLB,, | Performed by: NURSE PRACTITIONER

## 2022-03-22 NOTE — PROGRESS NOTES
"    Subjective:       Patient ID: Luciana Jacob is a 21 y.o. female.    Chief Complaint:  Possible Pregnancy      History of Present Illness   Presents for positive pregnancy test.  EDC by LMP 2022 making her approximately 5 weeks and 3 days  Denies any complaints today.  No history of abnormal Pap smears or sexually transmitted infections.  History reviewed. No pertinent past medical history.  Past Surgical History:   Procedure Laterality Date    KNEE SURGERY Left          OB History        1    Para        Term                AB        Living           SAB        IAB        Ectopic        Multiple        Live Births                      Review of Systems  Review of Systems   Constitutional: Negative for chills and fever.   Respiratory: Negative for shortness of breath.    Cardiovascular: Negative for chest pain.   Gastrointestinal: Negative for abdominal pain, blood in stool, constipation, diarrhea, nausea, vomiting and reflux.   Genitourinary: Negative for dysmenorrhea, dyspareunia, dysuria, hematuria, hot flashes, menorrhagia, menstrual problem, pelvic pain, vaginal bleeding, vaginal discharge, postcoital bleeding and vaginal dryness.   Musculoskeletal: Negative for arthralgias and joint swelling.   Integumentary:  Negative for rash, hair changes, breast mass, nipple discharge and breast skin changes.   Psychiatric/Behavioral: Negative for depression. The patient is not nervous/anxious.    Breast: Negative for asymmetry, lump, mass, nipple discharge and skin changes          Objective:     Vitals:    22 1402   BP: 121/75   Pulse: 70   Weight: 85.7 kg (189 lb)   Height: 5' 5" (1.651 m)        Physical Exam:   Constitutional: She is oriented to person, place, and time. She appears well-developed and well-nourished.    HENT:   Head: Normocephalic and atraumatic.    Eyes: Pupils are equal, round, and reactive to light. Conjunctivae are normal.    Neck: No thyromegaly present.  "   Cardiovascular: Normal rate, regular rhythm and normal heart sounds.     Pulmonary/Chest: Effort normal and breath sounds normal.        Abdominal: Soft.     Genitourinary: Uterus is enlarged.           Musculoskeletal: Normal range of motion and moves all extremeties.       Neurological: She is alert and oriented to person, place, and time. She has normal reflexes.    Skin: Skin is warm and dry.    Psychiatric: She has a normal mood and affect. Her behavior is normal. Judgment and thought content normal.       Assessment:     1. Positive pregnancy test    2. Positive pregnancy test              Plan:       Positive pregnancy test  -     Ambulatory referral/consult to Obstetrics / Gynecology  -     CBC Auto Differential; Future; Expected date: 03/22/2022  -     Treponema Pallidum (Syphillis) Antibody; Future; Expected date: 03/22/2022  -     Antibody Screen; Future; Expected date: 03/22/2022  -     Type & Screen; Future; Expected date: 03/22/2022  -     HIV 1/2 Ag/Ab (4th Gen); Future; Expected date: 03/22/2022  -     Hepatitis C Antibody; Future; Expected date: 03/22/2022  -     Rubella Antibody, IgG; Future; Expected date: 03/22/2022  -     Hepatitis B Surface Antigen; Future; Expected date: 03/22/2022  -     Sickle Cell Screen; Future; Expected date: 03/22/2022  -     Cystic Fibrosis Mutation Panel; Future; Expected date: 03/22/2022  -     C. trachomatis/N. gonorrhoeae by AMP DNA [facility]; Urine  -     Non-DOT Drug Screen 8 Panel, Urine  -     US OB/GYN Procedure ([facility]) - Extended List; Future    Positive pregnancy test  Comments:  Referred to GYN for well woman exam  Orders:  -     Ambulatory referral/consult to Obstetrics / Gynecology  -     CBC Auto Differential; Future; Expected date: 03/22/2022  -     Treponema Pallidum (Syphillis) Antibody; Future; Expected date: 03/22/2022  -     Antibody Screen; Future; Expected date: 03/22/2022  -     Type & Screen; Future; Expected date: 03/22/2022  -     HIV 1/2  Ag/Ab (4th Gen); Future; Expected date: 03/22/2022  -     Hepatitis C Antibody; Future; Expected date: 03/22/2022  -     Rubella Antibody, IgG; Future; Expected date: 03/22/2022  -     Hepatitis B Surface Antigen; Future; Expected date: 03/22/2022  -     Sickle Cell Screen; Future; Expected date: 03/22/2022  -     Cystic Fibrosis Mutation Panel; Future; Expected date: 03/22/2022  -     C. trachomatis/N. gonorrhoeae by AMP DNA [facility]; Urine  -     Non-DOT Drug Screen 8 Panel, Urine  -     US OB/GYN Procedure ([facility]) - Extended List; Future       Encouraged prenatal vitamin  Pain/fever/bleeding precautions  Discussed healthy diet, lifestyle, and activity during pregnancy  Follow up in about 4 weeks (around 4/19/2022).

## 2022-03-23 LAB
AMPHETAMINES (500): NEGATIVE NG/ML
BARBITURATES (200): NEGATIVE NG/ML
BENZODIAZEPINES: NEGATIVE NG/ML
COCAINE (150): NEGATIVE NG/ML
MARIJUANA, THC (50): NEGATIVE NG/ML
METHADONE: NEGATIVE NG/ML
OPIATES: NEGATIVE NG/ML
OXYCODONE: NEGATIVE NG/ML
PH: 5.9 (ref 4.5–8)
PHENCYCLIDINE (25): NEGATIVE NG/ML
URINE CREATININE D/S: 63.9 MG/DL

## 2022-03-28 ENCOUNTER — PATIENT MESSAGE (OUTPATIENT)
Dept: OBSTETRICS AND GYNECOLOGY | Facility: CLINIC | Age: 22
End: 2022-03-28
Payer: MEDICAID

## 2022-03-29 ENCOUNTER — PROCEDURE VISIT (OUTPATIENT)
Dept: OBSTETRICS AND GYNECOLOGY | Facility: CLINIC | Age: 22
End: 2022-03-29
Payer: MEDICAID

## 2022-03-29 DIAGNOSIS — Z34.01 ENCOUNTER FOR SUPERVISION OF NORMAL FIRST PREGNANCY IN FIRST TRIMESTER: Primary | ICD-10-CM

## 2022-03-29 PROCEDURE — 76801 OB US < 14 WKS SINGLE FETUS: CPT | Mod: S$GLB,,, | Performed by: OBSTETRICS & GYNECOLOGY

## 2022-03-29 PROCEDURE — 76801 US OB/GYN PROCEDURE (VIEWPOINT): ICD-10-PCS | Mod: S$GLB,,, | Performed by: OBSTETRICS & GYNECOLOGY

## 2022-03-30 LAB
ABS NRBC COUNT: 0 X 10 3/UL (ref 0–0.01)
ABSOLUTE BASOPHIL: 0.03 X 10 3/UL (ref 0–0.22)
ABSOLUTE EOSINOPHIL: 0.09 X 10 3/UL (ref 0.04–0.54)
ABSOLUTE IMMATURE GRAN: 0.03 X 10 3/UL (ref 0–0.04)
ABSOLUTE LYMPHOCYTE: 1.79 X 10 3/UL (ref 0.86–4.75)
ABSOLUTE MONOCYTE: 0.7 X 10 3/UL (ref 0.22–1.08)
ANTIBODY SCREEN: NEGATIVE
BASOPHILS NFR BLD: 0.3 % (ref 0.2–1.2)
BLOOD GROUPING: NORMAL
BLOOD TYPE (D): POSITIVE
EOSINOPHIL NFR BLD: 0.8 % (ref 0.7–7)
HBV SURFACE AG SERPL QL IA: NONREACTIVE
HCT VFR BLD AUTO: 37.7 % (ref 37–47)
HCV IGG SERPL QL IA: NONREACTIVE
HGB BLD-MCNC: 12.2 G/DL (ref 12–16)
HIV 1+2 AB+HIV1 P24 AG SERPL QL IA: NONREACTIVE
IMMATURE GRANULOCYTES: 0.3 % (ref 0–0.5)
LYMPHOCYTES NFR BLD: 16.5 % (ref 19.3–53.1)
MCH RBC QN AUTO: 28.9 PG (ref 27–32)
MCHC RBC AUTO-ENTMCNC: 32.4 G/DL (ref 32–36)
MCV RBC AUTO: 89.3 FL (ref 82–100)
MONOCYTES NFR BLD: 6.4 % (ref 4.7–12.5)
NEUTROPHILS # BLD AUTO: 8.24 X 10 3/UL (ref 2.15–7.56)
NEUTROPHILS NFR BLD: 75.7 % (ref 34–71.1)
NUCLEATED RED BLOOD CELLS: 0 /100 WBC (ref 0–0.2)
PLATELET # BLD AUTO: 281 X 10 3/UL (ref 135–400)
RBC # BLD AUTO: 4.22 X 10 6/UL (ref 4.2–5.4)
RDW-SD: 40.1 FL (ref 37–54)
RUBELLA IGG SCREEN: NORMAL
SICKLE CELL PREP: NEGATIVE
SYPHILIS TREPONEMAL ANTIBODY: NONREACTIVE
WBC # BLD: 10.88 X 10 3/UL (ref 4.3–10.8)

## 2022-04-21 ENCOUNTER — PATIENT MESSAGE (OUTPATIENT)
Dept: OBSTETRICS AND GYNECOLOGY | Facility: CLINIC | Age: 22
End: 2022-04-21
Payer: MEDICAID

## 2022-04-26 ENCOUNTER — INITIAL PRENATAL (OUTPATIENT)
Dept: OBSTETRICS AND GYNECOLOGY | Facility: CLINIC | Age: 22
End: 2022-04-26
Payer: MEDICAID

## 2022-04-26 VITALS
HEART RATE: 94 BPM | DIASTOLIC BLOOD PRESSURE: 74 MMHG | SYSTOLIC BLOOD PRESSURE: 127 MMHG | WEIGHT: 193.25 LBS | BODY MASS INDEX: 32.16 KG/M2

## 2022-04-26 DIAGNOSIS — A74.9 CHLAMYDIA INFECTION AFFECTING PREGNANCY IN FIRST TRIMESTER: ICD-10-CM

## 2022-04-26 DIAGNOSIS — O98.811 CHLAMYDIA INFECTION AFFECTING PREGNANCY IN FIRST TRIMESTER: ICD-10-CM

## 2022-04-26 DIAGNOSIS — Z34.01 ENCOUNTER FOR SUPERVISION OF NORMAL FIRST PREGNANCY IN FIRST TRIMESTER: Primary | ICD-10-CM

## 2022-04-26 PROCEDURE — 99213 OFFICE O/P EST LOW 20 MIN: CPT | Mod: TH,S$GLB,, | Performed by: NURSE PRACTITIONER

## 2022-04-26 PROCEDURE — 99213 PR OFFICE/OUTPT VISIT, EST, LEVL III, 20-29 MIN: ICD-10-PCS | Mod: TH,S$GLB,, | Performed by: NURSE PRACTITIONER

## 2022-04-26 RX ORDER — AZITHROMYCIN 500 MG/1
1000 TABLET, FILM COATED ORAL ONCE
Qty: 2 TABLET | Refills: 0 | Status: SHIPPED | OUTPATIENT
Start: 2022-04-26 | End: 2022-04-26

## 2022-04-26 NOTE — PROGRESS NOTES
Subjective:       Patient ID: Luciana Jacob is a 21 y.o.  at 11w2d     Chief Complaint:  Initial Prenatal Visit      History of Present Illness  No complaints. Reports normal pregnancy ssx. Labs and history reviewed with pt.     OB History        1    Para        Term                AB        Living           SAB        IAB        Ectopic        Multiple        Live Births                      Review of Systems  Denies n/v, dysuria,cramping,  VD, VB, round ligament pain or headaches       Objective:     Vitals:    22 1456   BP: 127/74   Pulse: 94     Wt Readings from Last 3 Encounters:   22 87.7 kg (193 lb 4 oz)   22 85.7 kg (189 lb)   22 84.8 kg (187 lb)     nad  NCAT  pupils normal size  Skin nml no rashes or lesions  No resp distress, resp even and unlabored   nt, nd,  no rebound no guarding  No cyanosis or clubbing, edema appropriate for pregn  FHT: 160s  Assessment:     1. Encounter for supervision of normal first pregnancy in first trimester    2. Chlamydia infection affecting pregnancy in first trimester               Plan:     Encounter for supervision of normal first pregnancy in first trimester  -     Urinalysis  -     Urine Culture High Risk  -     PREQUEL® Prenatal Screen (NIPS); Future; Expected date: 2022    Chlamydia infection affecting pregnancy in first trimester  -     azithromycin (ZITHROMAX) 500 MG tablet; Take 2 tablets (1,000 mg total) by mouth once. for 1 dose  Dispense: 2 tablet; Refill: 0         Encouraged PNV  Pain, fever, bleeding precautions   Partner Jarod Kye 02 Zithromax 1 gm x 1 dose sent to    Follow up in about 4 weeks (around 2022).

## 2022-04-27 LAB
AMORPH URATE CRY URNS QL MICRO: ABNORMAL
BACTERIA #/AREA URNS HPF: ABNORMAL /[HPF]
BILIRUB UR QL STRIP: NEGATIVE
CALCIUM OXALATE: ABNORMAL
CLARITY UR: ABNORMAL
COLOR UR: YELLOW
EPITHELIAL CELLS: ABNORMAL
GLUCOSE (UA): NEGATIVE MG/DL
KETONES UR QL STRIP: NEGATIVE MG/DL
LEUKOCYTE ESTERASE UR QL STRIP: ABNORMAL
MUCOUS THREADS URNS QL MICRO: NEGATIVE
NITRITE UR QL STRIP: NEGATIVE
OCCULT BLOOD: ABNORMAL
PH, URINE: 6 (ref 5–7.5)
PROT UR QL STRIP: NEGATIVE MG/DL
RBC/HPF: ABNORMAL
SP GR UR STRIP: 1.03 (ref 1–1.03)
UROBILINOGEN, URINE: 1 E.U./DL (ref 0–1)
WBC/HPF: ABNORMAL

## 2022-04-28 DIAGNOSIS — O23.41 URINARY TRACT INFECTION IN MOTHER DURING FIRST TRIMESTER OF PREGNANCY: Primary | ICD-10-CM

## 2022-04-28 LAB — URINE CULTURE, ROUTINE: NORMAL

## 2022-04-28 RX ORDER — CEPHALEXIN 500 MG/1
500 CAPSULE ORAL 3 TIMES DAILY
Qty: 21 CAPSULE | Refills: 0 | Status: SHIPPED | OUTPATIENT
Start: 2022-04-28 | End: 2022-12-06 | Stop reason: ALTCHOICE

## 2022-05-12 ENCOUNTER — TELEPHONE (OUTPATIENT)
Dept: OBSTETRICS AND GYNECOLOGY | Facility: CLINIC | Age: 22
End: 2022-05-12
Payer: MEDICAID

## 2022-05-12 NOTE — TELEPHONE ENCOUNTER
----- Message from Nicky Irvin sent at 5/11/2022  2:09 PM CDT -----  Patient calling for test results. Call back number [phone]. Tks

## 2022-05-24 ENCOUNTER — ROUTINE PRENATAL (OUTPATIENT)
Dept: OBSTETRICS AND GYNECOLOGY | Facility: CLINIC | Age: 22
End: 2022-05-24
Payer: MEDICAID

## 2022-05-24 VITALS
SYSTOLIC BLOOD PRESSURE: 107 MMHG | DIASTOLIC BLOOD PRESSURE: 69 MMHG | BODY MASS INDEX: 32.12 KG/M2 | HEART RATE: 79 BPM | WEIGHT: 193 LBS

## 2022-05-24 DIAGNOSIS — Z34.02 ENCOUNTER FOR SUPERVISION OF NORMAL FIRST PREGNANCY IN SECOND TRIMESTER: Primary | ICD-10-CM

## 2022-05-24 PROCEDURE — 99213 PR OFFICE/OUTPT VISIT, EST, LEVL III, 20-29 MIN: ICD-10-PCS | Mod: TH,S$GLB,, | Performed by: NURSE PRACTITIONER

## 2022-05-24 PROCEDURE — 99213 OFFICE O/P EST LOW 20 MIN: CPT | Mod: TH,S$GLB,, | Performed by: NURSE PRACTITIONER

## 2022-05-24 NOTE — PROGRESS NOTES
Subjective:       Patient ID: Luciana Jacob is a 21 y.o.  at 15w2d      Chief Complaint:  Routine Prenatal Visit (Pt st that she is having a lot of headaches. )      History of Present Illness  No complaints. Labs and history reviewed with pt. NIPS negative XX      Review of Systems  Denies n/v, f/c, dysuria, contractions,   VD, VB, round ligament pain, headaches      OB History        1    Para        Term                AB        Living           SAB        IAB        Ectopic        Multiple        Live Births                       Objective:     Vitals:    22 1447   BP: 107/69   Pulse: 79     Wt Readings from Last 3 Encounters:   22 87.5 kg (193 lb)   22 87.7 kg (193 lb 4 oz)   22 85.7 kg (189 lb)        nad  NCAT  pupils normal size  Skin nml no rashes or lesions  No resp distress, resp even and unlabored  Gravid nt, no rebound no guarding  No cyanosis or clubbing, edema appropriate for pregn  FH AGA  FHT: 150s       Assessment:        1. Encounter for supervision of normal first pregnancy in second trimester                Plan:        Encounter for supervision of normal first pregnancy in second trimester  -     Maternal Serum, Quad Screen; Future; Expected date: 2022         Encouraged PNV  Pain, fever, bleeding precautions   Follow up in about 4 weeks (around 2022).

## 2022-06-21 ENCOUNTER — ROUTINE PRENATAL (OUTPATIENT)
Dept: OBSTETRICS AND GYNECOLOGY | Facility: CLINIC | Age: 22
End: 2022-06-21
Payer: MEDICAID

## 2022-06-21 VITALS
DIASTOLIC BLOOD PRESSURE: 79 MMHG | HEART RATE: 99 BPM | WEIGHT: 198 LBS | SYSTOLIC BLOOD PRESSURE: 118 MMHG | BODY MASS INDEX: 32.95 KG/M2

## 2022-06-21 DIAGNOSIS — Z36.89 ENCOUNTER FOR FETAL ANATOMIC SURVEY: ICD-10-CM

## 2022-06-21 DIAGNOSIS — Z34.02 ENCOUNTER FOR SUPERVISION OF NORMAL FIRST PREGNANCY IN SECOND TRIMESTER: Primary | ICD-10-CM

## 2022-06-21 PROCEDURE — 99213 PR OFFICE/OUTPT VISIT, EST, LEVL III, 20-29 MIN: ICD-10-PCS | Mod: TH,S$GLB,, | Performed by: NURSE PRACTITIONER

## 2022-06-21 PROCEDURE — 99213 OFFICE O/P EST LOW 20 MIN: CPT | Mod: TH,S$GLB,, | Performed by: NURSE PRACTITIONER

## 2022-06-21 NOTE — PROGRESS NOTES
Subjective:       Patient ID: Luciana Jacob is a 21 y.o.  at 19w2d      Chief Complaint:  Routine Prenatal Visit      History of Present Illness  No complaints. Reports normal. Labs and history reviewed with pt.       Review of Systems  Denies n/v, f/c, dysuria, contractions,   VD, VB, round ligament pain, headaches      OB History        1    Para        Term                AB        Living           SAB        IAB        Ectopic        Multiple        Live Births                       Objective:     Vitals:    22 1513   BP: 118/79   Pulse: 99     Wt Readings from Last 3 Encounters:   22 89.8 kg (198 lb)   22 87.5 kg (193 lb)   22 87.7 kg (193 lb 4 oz)        nad  NCAT  pupils normal size  Skin nml no rashes or lesions  No resp distress, resp even and unlabored  Gravid nt, no rebound no guarding  No cyanosis or clubbing, edema appropriate for pregn  FH AGA  FHT: 150s       Assessment:        1. Encounter for supervision of normal first pregnancy in second trimester    2. Encounter for fetal anatomic survey                Plan:        Encounter for supervision of normal first pregnancy in second trimester    Encounter for fetal anatomic survey  -     US OB/GYN Procedure ([facility]) - Extended List; Future         Locate [facility]SA[facility]   Encouraged PNV  Pain, fever, bleeding precautions   Follow up in about 4 weeks (around 2022).

## 2022-07-18 ENCOUNTER — PROCEDURE VISIT (OUTPATIENT)
Dept: OBSTETRICS AND GYNECOLOGY | Facility: CLINIC | Age: 22
End: 2022-07-18
Payer: MEDICAID

## 2022-07-18 DIAGNOSIS — Z34.02 ENCOUNTER FOR SUPERVISION OF NORMAL FIRST PREGNANCY IN SECOND TRIMESTER: Primary | ICD-10-CM

## 2022-07-18 DIAGNOSIS — Z34.02 ENCOUNTER FOR SUPERVISION OF NORMAL FIRST PREGNANCY IN SECOND TRIMESTER: ICD-10-CM

## 2022-07-21 ENCOUNTER — ROUTINE PRENATAL (OUTPATIENT)
Dept: OBSTETRICS AND GYNECOLOGY | Facility: CLINIC | Age: 22
End: 2022-07-21
Payer: MEDICAID

## 2022-07-21 VITALS
HEART RATE: 73 BPM | SYSTOLIC BLOOD PRESSURE: 110 MMHG | DIASTOLIC BLOOD PRESSURE: 71 MMHG | WEIGHT: 201.38 LBS | BODY MASS INDEX: 33.51 KG/M2

## 2022-07-21 DIAGNOSIS — Z34.02 ENCOUNTER FOR SUPERVISION OF NORMAL FIRST PREGNANCY IN SECOND TRIMESTER: Primary | ICD-10-CM

## 2022-07-21 PROCEDURE — 99213 OFFICE O/P EST LOW 20 MIN: CPT | Mod: TH,S$GLB,, | Performed by: NURSE PRACTITIONER

## 2022-07-21 PROCEDURE — 99213 PR OFFICE/OUTPT VISIT, EST, LEVL III, 20-29 MIN: ICD-10-PCS | Mod: TH,S$GLB,, | Performed by: NURSE PRACTITIONER

## 2022-07-21 NOTE — LETTER
July 21, 2022      [place] [facility]) - OB GYN  [address], [address]  [place] [address]  Phone: [phone]  Fax: [phone]       Patient: Luciana Jacob   YOB: 2000  Date of Visit: 07/21/2022    To Whom It May Concern:    Carie Jacob  was at [facility] on 07/21/2022. The patient may return to work/school on 07/22/2022with no restrictions. If you have any questions or concerns, or if I can be of further assistance, please do not hesitate to contact me.    Sincerely,      Suha Hanson, NP

## 2022-08-08 ENCOUNTER — ROUTINE PRENATAL (OUTPATIENT)
Dept: OBSTETRICS AND GYNECOLOGY | Facility: CLINIC | Age: 22
End: 2022-08-08
Payer: MEDICAID

## 2022-08-08 VITALS
WEIGHT: 206 LBS | BODY MASS INDEX: 34.28 KG/M2 | SYSTOLIC BLOOD PRESSURE: 124 MMHG | DIASTOLIC BLOOD PRESSURE: 68 MMHG | HEART RATE: 81 BPM

## 2022-08-08 DIAGNOSIS — Z34.02 ENCOUNTER FOR SUPERVISION OF NORMAL FIRST PREGNANCY IN SECOND TRIMESTER: Primary | ICD-10-CM

## 2022-08-08 PROCEDURE — 99213 OFFICE O/P EST LOW 20 MIN: CPT | Mod: TH,S$GLB,, | Performed by: OBSTETRICS & GYNECOLOGY

## 2022-08-08 PROCEDURE — 99213 PR OFFICE/OUTPT VISIT, EST, LEVL III, 20-29 MIN: ICD-10-PCS | Mod: TH,S$GLB,, | Performed by: OBSTETRICS & GYNECOLOGY

## 2022-08-08 NOTE — PROGRESS NOTES
Subjective:       Patient ID: Luciana Jacob is a 21 y.o.  at 26w1d      Chief Complaint:  Routine Prenatal Visit      History of Present Illness  No complaints. Reports normal sx. Labs and history reviewed with pt.       Review of Systems  Denies n/v, f/c, dysuria, contractions,   VD, VB, round ligament pain, headaches       Objective:     Vitals:    22 0927   BP: 124/68   Pulse: 81     Wt Readings from Last 3 Encounters:   22 93.4 kg (206 lb)   22 91.3 kg (201 lb 6 oz)   22 89.8 kg (198 lb)        nad  NCAT  pupils normal size  Skin nml no rashes or lesions  No resp distress, resp even and unlabored  Gravid nt, no rebound no guarding  No cyanosis or clubbing, edema appropriate for pregn    FHT: 150's       Assessment:        1. Encounter for supervision of normal first pregnancy in second trimester                Plan:        [place]  Encouraged PNV  Pain, fever, bleeding precautions   RTC 4 weeks

## 2022-08-25 ENCOUNTER — ROUTINE PRENATAL (OUTPATIENT)
Dept: OBSTETRICS AND GYNECOLOGY | Facility: CLINIC | Age: 22
End: 2022-08-25
Payer: MEDICAID

## 2022-08-25 VITALS
WEIGHT: 209.5 LBS | DIASTOLIC BLOOD PRESSURE: 72 MMHG | BODY MASS INDEX: 34.86 KG/M2 | HEART RATE: 90 BPM | SYSTOLIC BLOOD PRESSURE: 112 MMHG

## 2022-08-25 DIAGNOSIS — Z34.03 ENCOUNTER FOR SUPERVISION OF NORMAL FIRST PREGNANCY IN THIRD TRIMESTER: Primary | ICD-10-CM

## 2022-08-25 DIAGNOSIS — J30.1 NON-SEASONAL ALLERGIC RHINITIS DUE TO POLLEN: ICD-10-CM

## 2022-08-25 LAB
ABS NRBC COUNT: 0 X 10 3/UL (ref 0–0.01)
ABSOLUTE BASOPHIL: 0.02 X 10 3/UL (ref 0–0.22)
ABSOLUTE EOSINOPHIL: 0.08 X 10 3/UL (ref 0.04–0.54)
ABSOLUTE IMMATURE GRAN: 0.06 X 10 3/UL (ref 0–0.04)
ABSOLUTE LYMPHOCYTE: 1.11 X 10 3/UL (ref 0.86–4.75)
ABSOLUTE MONOCYTE: 0.61 X 10 3/UL (ref 0.22–1.08)
BASOPHILS NFR BLD: 0.2 % (ref 0.2–1.2)
EOSINOPHIL NFR BLD: 0.9 % (ref 0.7–7)
GLUCOSE 1 HR POST 50 GM: 101 MG/DL
HCT VFR BLD AUTO: 35 % (ref 37–47)
HGB BLD-MCNC: 11.3 G/DL (ref 12–16)
IMMATURE GRANULOCYTES: 0.7 % (ref 0–0.5)
LYMPHOCYTES NFR BLD: 12.2 % (ref 19.3–53.1)
MCH RBC QN AUTO: 28.8 PG (ref 27–32)
MCHC RBC AUTO-ENTMCNC: 32.3 G/DL (ref 32–36)
MCV RBC AUTO: 89.3 FL (ref 82–100)
MONOCYTES NFR BLD: 6.7 % (ref 4.7–12.5)
NEUTROPHILS # BLD AUTO: 7.19 X 10 3/UL (ref 2.15–7.56)
NEUTROPHILS NFR BLD: 79.3 % (ref 34–71.1)
NUCLEATED RED BLOOD CELLS: 0 /100 WBC (ref 0–0.2)
PLATELET # BLD AUTO: 276 X 10 3/UL (ref 135–400)
RBC # BLD AUTO: 3.92 X 10 6/UL (ref 4.2–5.4)
RDW-SD: 42.2 FL (ref 37–54)
WBC # BLD: 9.07 X 10 3/UL (ref 4.3–10.8)

## 2022-08-25 PROCEDURE — 99213 PR OFFICE/OUTPT VISIT, EST, LEVL III, 20-29 MIN: ICD-10-PCS | Mod: TH,S$GLB,, | Performed by: NURSE PRACTITIONER

## 2022-08-25 PROCEDURE — 99213 OFFICE O/P EST LOW 20 MIN: CPT | Mod: TH,S$GLB,, | Performed by: NURSE PRACTITIONER

## 2022-08-25 RX ORDER — FLUTICASONE PROPIONATE 50 MCG
1 SPRAY, SUSPENSION (ML) NASAL DAILY
Qty: 9.9 ML | Refills: 0 | Status: SHIPPED | OUTPATIENT
Start: 2022-08-25 | End: 2022-12-06

## 2022-08-25 NOTE — PROGRESS NOTES
CC: Follow-Up OB    HPI:   21 y.o.  at 28w4d with EDC of 2022, by Ultrasound, here for her follow up OB visit.  Complains of rash on left side of groin.     Pregnancy ROS:  Positive fetal movement   Negative leakage of fluid   Negative vaginal bleeding   Negative headache, vision changes, RUQ pain, epigastric pain  Negative contractions/abdominal pain   Negative pelvic pressure     Physical Exam:  Prenatal Vitals  BP: 112/72  Weight: 95 kg (209 lb 8 oz)  Fetal Heart Rate: 140's    Wt Readings from Last 3 Encounters:   22 95 kg (209 lb 8 oz) (98 %, Z= 2.08)*   22 93.4 kg (206 lb) (98 %, Z= 2.03)*   22 91.3 kg (201 lb 6 oz) (98 %, Z= 1.96)*     * Growth percentiles are based on [facility] (Girls, 2-20 Years) data.       Body mass index is 34.86 kg/m².    General: NAD, well developed, well nourished  Psych: alert and oriented to person, time and place, normal affect  HEENT: normocephalic, atraumatic  Abd: Gravid, soft, NT, ND  Skin: warm, dry, dermatitis type rash noted to left groin area   Neuro: normal gait, gross motor function intact  No cyanosis, clubbing or edema    FHTs: 140s  FH: 26 cm     Maternal Blood Type: O+    ASSESSMENT: 21 y.o.  @ 28w4d with   1. Encounter for supervision of normal first pregnancy in third trimester    2. Non-seasonal allergic rhinitis due to pollen         PLAN:  Encounter for supervision of normal first pregnancy in third trimester    Non-seasonal allergic rhinitis due to pollen  -     fluticasone propionate (FLONASE) 50 mcg/actuation nasal spray; 1 spray (50 mcg total) by Each Nostril route once daily.  Dispense: 9.9 mL; Refill: 0       Pain, fever, bleeding precautions  Labor, Fetal movement, and Preeclampsia precautions  Cont PNV  hydrocortisone BID and A&D ointment  To area  1 GTT/CBC ordered   Return to clinic in 2 week  Tdap discussed

## 2022-09-08 ENCOUNTER — ROUTINE PRENATAL (OUTPATIENT)
Dept: OBSTETRICS AND GYNECOLOGY | Facility: CLINIC | Age: 22
End: 2022-09-08
Payer: MEDICAID

## 2022-09-08 VITALS
HEART RATE: 82 BPM | SYSTOLIC BLOOD PRESSURE: 125 MMHG | BODY MASS INDEX: 35.67 KG/M2 | WEIGHT: 214.38 LBS | DIASTOLIC BLOOD PRESSURE: 76 MMHG

## 2022-09-08 DIAGNOSIS — Z34.83 ENCOUNTER FOR SUPERVISION OF NORMAL PREGNANCY IN MULTIGRAVIDA IN THIRD TRIMESTER: Primary | ICD-10-CM

## 2022-09-08 DIAGNOSIS — L30.4 INTERTRIGO: ICD-10-CM

## 2022-09-08 PROCEDURE — 90715 TDAP VACCINE GREATER THAN OR EQUAL TO 7YO IM: ICD-10-PCS | Mod: S$GLB,,, | Performed by: NURSE PRACTITIONER

## 2022-09-08 PROCEDURE — 99213 OFFICE O/P EST LOW 20 MIN: CPT | Mod: 25,TH,S$GLB, | Performed by: NURSE PRACTITIONER

## 2022-09-08 PROCEDURE — 90471 IMMUNIZATION ADMIN: CPT | Mod: S$GLB,,, | Performed by: NURSE PRACTITIONER

## 2022-09-08 PROCEDURE — 90715 TDAP VACCINE 7 YRS/> IM: CPT | Mod: S$GLB,,, | Performed by: NURSE PRACTITIONER

## 2022-09-08 PROCEDURE — 99213 PR OFFICE/OUTPT VISIT, EST, LEVL III, 20-29 MIN: ICD-10-PCS | Mod: 25,TH,S$GLB, | Performed by: NURSE PRACTITIONER

## 2022-09-08 PROCEDURE — 90471 TDAP VACCINE GREATER THAN OR EQUAL TO 7YO IM: ICD-10-PCS | Mod: S$GLB,,, | Performed by: NURSE PRACTITIONER

## 2022-09-08 RX ORDER — NYSTATIN 100000 [USP'U]/G
POWDER TOPICAL 2 TIMES DAILY
Qty: 30 G | Refills: 0 | Status: SHIPPED | OUTPATIENT
Start: 2022-09-08 | End: 2022-12-06

## 2022-09-08 NOTE — PROGRESS NOTES
CC: Follow-Up OB    HPI:   22 y.o.  at 30w4d with EDC of 2022, by Ultrasound, here for her follow up OB visit.  Complains that rash worsened with hydrocortisone. Reports some relief with monistat    Pregnancy ROS:  Positive fetal movement   Negative leakage of fluid   Negative vaginal bleeding   Negative headache, vision changes, RUQ pain, epigastric pain  Negative contractions/abdominal pain   Negative pelvic pressure     Physical Exam:  Prenatal Vitals  BP: 125/76  Weight: 97.2 kg (214 lb 6 oz)  Fetal Heart Rate: 140's    Wt Readings from Last 3 Encounters:   22 97.2 kg (214 lb 6 oz)   22 95 kg (209 lb 8 oz) (98 %, Z= 2.08)*   22 93.4 kg (206 lb) (98 %, Z= 2.03)*     * Growth percentiles are based on [facility] (Girls, 2-20 Years) data.       Body mass index is 35.67 kg/m².    General: NAD, well developed, well nourished  Psych: alert and oriented to person, time and place, normal affect  HEENT: normocephalic, atraumatic  Abd: Gravid, soft, NT, ND  Skin: warm, dry  Neuro: normal gait, gross motor function intact  No cyanosis, clubbing or edema    FHTs: 140s  FH: AGA    Maternal Blood Type: O+    ASSESSMENT: 22 y.o.  @ 30w4d with   1. Encounter for supervision of normal pregnancy in multigravida in third trimester    2. Intertrigo         PLAN:  Encounter for supervision of normal pregnancy in multigravida in third trimester  -     Tdap Vaccine    Intertrigo  -     nystatin (MYCOSTATIN) powder; Apply topically 2 (two) times daily. for 14 days  Dispense: 30 g; Refill: 0       Pain, fever, bleeding precautions  Labor, Fetal movement, and Preeclampsia precautions  Cont PNV  Return to clinic in 2 weeks

## 2022-09-19 LAB
APPEARANCE, UA: CLEAR
BACTERIA SPEC CULT: ABNORMAL /HPF
BILIRUB UR QL STRIP: NEGATIVE MG/DL
COLOR UR: ABNORMAL
GLUCOSE (UA): NORMAL MG/DL
HGB UR QL STRIP: NEGATIVE /UL
KETONES UR QL STRIP: NEGATIVE MG/DL
LEUKOCYTE ESTERASE UR QL STRIP: 100 /UL
NITRITE UR QL STRIP: NEGATIVE
PH UR STRIP: 7 PH (ref 5–9)
PROT UR QL STRIP: NEGATIVE MG/DL
RBC #/AREA URNS HPF: ABNORMAL /HPF (ref 0–2)
SERVICE COMMENT 03: ABNORMAL
SP GR UR STRIP: 1.01 (ref 1–1.03)
SPECIMEN COLLECTION METHOD, URINE: ABNORMAL
SQUAMOUS EPITHELIAL, UA: ABNORMAL /LPF
UROBILINOGEN UR STRIP-ACNC: NORMAL MG/DL
WBC #/AREA URNS HPF: ABNORMAL /HPF (ref 0–5)

## 2022-09-22 ENCOUNTER — ROUTINE PRENATAL (OUTPATIENT)
Dept: OBSTETRICS AND GYNECOLOGY | Facility: CLINIC | Age: 22
End: 2022-09-22
Payer: MEDICAID

## 2022-09-22 ENCOUNTER — PATIENT MESSAGE (OUTPATIENT)
Dept: OBSTETRICS AND GYNECOLOGY | Facility: CLINIC | Age: 22
End: 2022-09-22

## 2022-09-22 ENCOUNTER — TELEPHONE (OUTPATIENT)
Dept: OBSTETRICS AND GYNECOLOGY | Facility: CLINIC | Age: 22
End: 2022-09-22
Payer: MEDICAID

## 2022-09-22 VITALS
HEART RATE: 125 BPM | WEIGHT: 216 LBS | SYSTOLIC BLOOD PRESSURE: 116 MMHG | BODY MASS INDEX: 35.94 KG/M2 | DIASTOLIC BLOOD PRESSURE: 75 MMHG

## 2022-09-22 DIAGNOSIS — N76.0 BV (BACTERIAL VAGINOSIS): ICD-10-CM

## 2022-09-22 DIAGNOSIS — O26.899 CRAMPING AFFECTING PREGNANCY, ANTEPARTUM: ICD-10-CM

## 2022-09-22 DIAGNOSIS — Z34.03 ENCOUNTER FOR SUPERVISION OF NORMAL FIRST PREGNANCY IN THIRD TRIMESTER: Primary | ICD-10-CM

## 2022-09-22 DIAGNOSIS — R10.9 CRAMPING AFFECTING PREGNANCY, ANTEPARTUM: ICD-10-CM

## 2022-09-22 DIAGNOSIS — B96.89 BV (BACTERIAL VAGINOSIS): ICD-10-CM

## 2022-09-22 LAB — URINE CULTURE, ROUTINE: NORMAL

## 2022-09-22 PROCEDURE — 99213 OFFICE O/P EST LOW 20 MIN: CPT | Mod: TH,S$GLB,, | Performed by: NURSE PRACTITIONER

## 2022-09-22 PROCEDURE — 99213 PR OFFICE/OUTPT VISIT, EST, LEVL III, 20-29 MIN: ICD-10-PCS | Mod: TH,S$GLB,, | Performed by: NURSE PRACTITIONER

## 2022-09-22 RX ORDER — METRONIDAZOLE 500 MG/1
500 TABLET ORAL 2 TIMES DAILY
Qty: 14 TABLET | Refills: 0 | Status: SHIPPED | OUTPATIENT
Start: 2022-09-22 | End: 2022-09-29

## 2022-09-22 NOTE — TELEPHONE ENCOUNTER
Pt called wanting U/S because she was markus, pt went to hospital Monday, saw MARTIN Borden today. Dr. Sparks looked over pt chart from [facility] and [facility] and said that pt did not need an ultrasound. I advised pt that everything looked good and to just keep her appointments.               ----- Message from Robyn Carrasquillo sent at 9/22/2022  1:57 PM CDT -----  Regarding: Ultrasound  Contact: patient  Per phone call with patient she stated that she went to the ER Monday and she was advised to see her OBGYN to set up an appointment for an Ultrasound to be done.  Please return call at [phone] (home).    Thanks,  SJ

## 2022-09-22 NOTE — LETTER
September 22, 2022      [facility]) - OB GYN  [address], [address]  [place] [address]  Phone: [phone]  Fax: [phone]       Patient: Luciana Jacob   YOB: 2000  Date of Visit: 09/22/2022    To Whom It May Concern:    Carie Jacob  was at [facility] on 09/22/2022. The patient will start maternity leave. If you have any questions or concerns, or if I can be of further assistance, please do not hesitate to contact me.    Sincerely,      Suha Hanson NP

## 2022-09-22 NOTE — PROGRESS NOTES
CC: Follow-Up OB    HPI:   22 y.o.  at 32w4d with EDC of 2022, by Ultrasound, here for her follow up OB visit.  Complains of cramping. Seen at L&D over the weekend. Given an injection to stop contx.Reports cramping worsens with activity and is relieved with rest     Pregnancy ROS:  Positive fetal movement   Negative leakage of fluid   Negative vaginal bleeding   Negative headache, vision changes, RUQ pain, epigastric pain  Positive contractions/abdominal pain   Negative pelvic pressure     Physical Exam:  Prenatal Vitals  BP: 116/75  Weight: 98 kg (216 lb)  Fetal Heart Rate: 147    Wt Readings from Last 3 Encounters:   22 98 kg (216 lb)   22 97.2 kg (214 lb 6 oz)   22 95 kg (209 lb 8 oz) (98 %, Z= 2.08)*     * Growth percentiles are based on [facility] (Girls, 2-20 Years) data.       Body mass index is 35.94 kg/m².    General: NAD, well developed, well nourished  Psych: alert and oriented to person, time and place, normal affect  HEENT: normocephalic, atraumatic  Abd: Gravid, soft, NT, ND  Skin: warm, dry  Neuro: normal gait, gross motor function intact  Cvx closed/thick/posterior/-2/vtx  No cyanosis, clubbing or edema    FHTs: 140s  FH: AGA    Slide examined under the microscope and there were per HPF  Clue cells +many   Hyphae-negative  Trichomonas -negative  WBC-0-5      Maternal Blood Type: O+    ASSESSMENT: 22 y.o.  @ 32w4d with   1. Encounter for supervision of normal first pregnancy in third trimester    2. BV (bacterial vaginosis)    3. Cramping affecting pregnancy, antepartum         PLAN:  Encounter for supervision of normal first pregnancy in third trimester    BV (bacterial vaginosis)  -     metroNIDAZOLE (FLAGYL) 500 MG tablet; Take 1 tablet (500 mg total) by mouth 2 (two) times a day. for 7 days  Dispense: 14 tablet; Refill: 0    Cramping affecting pregnancy, antepartum  -     metroNIDAZOLE (FLAGYL) 500 MG tablet; Take 1 tablet (500 mg total) by mouth 2 (two) times a day. for 7  days  Dispense: 14 tablet; Refill: 0  -     C. trachomatis/N. gonorrhoeae by AMP DNA [facility]; Vagina  -     Trichomonas Vaginalis, GREER; Future; Expected date: 09/22/2022  -     Strep B Screen, Vaginal / Rectal       Pain, fever, bleeding precautions  Labor, Fetal movement, and Preeclampsia precautions  Cont PNV  PTL precautions  Maternity belt  Rest/hydration   Return to clinic in 1 week

## 2022-09-25 LAB
GROUP B STREP MOLECULAR: NEGATIVE
PENICILLIN ALLERGIC: NO

## 2022-09-26 LAB
CHLAMYDIA: NEGATIVE
GONORRHEA: NEGATIVE
SOURCE: NORMAL

## 2022-09-27 LAB
SOURCE: NORMAL
TRICHOMONAS AMPLIFIED: NEGATIVE

## 2022-09-28 LAB
APPEARANCE, UA: CLEAR
BILIRUB UR QL STRIP: NEGATIVE MG/DL
COLOR UR: ABNORMAL
GLUCOSE (UA): NORMAL MG/DL
HGB UR QL STRIP: NEGATIVE /UL
KETONES UR QL STRIP: NEGATIVE MG/DL
LEUKOCYTE ESTERASE UR QL STRIP: NEGATIVE /UL
NITRITE UR QL STRIP: NEGATIVE
PH UR STRIP: 6 PH (ref 5–9)
PROT UR QL STRIP: ABNORMAL MG/DL
SP GR UR STRIP: 1.02 (ref 1–1.03)
SPECIMEN COLLECTION METHOD, URINE: ABNORMAL
UROBILINOGEN UR STRIP-ACNC: 4 MG/DL

## 2022-10-07 LAB — INTERPRETATION: NORMAL

## 2022-10-10 ENCOUNTER — ROUTINE PRENATAL (OUTPATIENT)
Dept: OBSTETRICS AND GYNECOLOGY | Facility: CLINIC | Age: 22
End: 2022-10-10
Payer: MEDICAID

## 2022-10-10 VITALS
SYSTOLIC BLOOD PRESSURE: 131 MMHG | WEIGHT: 218.38 LBS | HEART RATE: 116 BPM | DIASTOLIC BLOOD PRESSURE: 78 MMHG | BODY MASS INDEX: 36.34 KG/M2

## 2022-10-10 DIAGNOSIS — Z34.03 ENCOUNTER FOR SUPERVISION OF NORMAL FIRST PREGNANCY IN THIRD TRIMESTER: Primary | ICD-10-CM

## 2022-10-10 PROCEDURE — 99213 OFFICE O/P EST LOW 20 MIN: CPT | Mod: TH,S$GLB,, | Performed by: NURSE PRACTITIONER

## 2022-10-10 PROCEDURE — 99213 PR OFFICE/OUTPT VISIT, EST, LEVL III, 20-29 MIN: ICD-10-PCS | Mod: TH,S$GLB,, | Performed by: NURSE PRACTITIONER

## 2022-10-10 NOTE — PROGRESS NOTES
CC: Follow-Up OB    HPI:   22 y.o.  at 35w1d with EDC of 2022, by Ultrasound, here for her follow up OB visit.  Complains of some cramping. Negative cultures     Pregnancy ROS:  Positive fetal movement   Negative leakage of fluid   Negative vaginal bleeding   Negative headache, vision changes, RUQ pain, epigastric pain  Negative contractions/abdominal pain   Negative pelvic pressure     Physical Exam:  Prenatal Vitals  BP: 131/78  Weight: 99.1 kg (218 lb 6 oz)  Fetal Heart Rate: 150's    Wt Readings from Last 3 Encounters:   10/10/22 99.1 kg (218 lb 6 oz)   10/10/22 99.1 kg (218 lb 6 oz)   22 98 kg (216 lb)       Body mass index is 36.34 kg/m².    General: NAD, well developed, well nourished  Psych: alert and oriented to person, time and place, normal affect  HEENT: normocephalic, atraumatic  Abd: Gravid, soft, NT, ND  Skin: warm, dry  Neuro: normal gait, gross motor function intact  Cvx closed/thick/-2/vtx  No cyanosis, clubbing or edema    FHTs: 150s  FH: AGA    Maternal Blood Type: O+    ASSESSMENT: 22 y.o.  @ 35w1d with   1. Encounter for supervision of normal first pregnancy in third trimester         PLAN:  Encounter for supervision of normal first pregnancy in third trimester     Pain, fever, bleeding precautions  Labor, Fetal movement, and Preeclampsia precautions  Cont PNV  Return to clinic in 1 week/Dr Sparks

## 2022-10-17 ENCOUNTER — ROUTINE PRENATAL (OUTPATIENT)
Dept: OBSTETRICS AND GYNECOLOGY | Facility: CLINIC | Age: 22
End: 2022-10-17
Payer: MEDICAID

## 2022-10-17 VITALS
SYSTOLIC BLOOD PRESSURE: 106 MMHG | WEIGHT: 217 LBS | DIASTOLIC BLOOD PRESSURE: 79 MMHG | BODY MASS INDEX: 36.11 KG/M2 | HEART RATE: 80 BPM

## 2022-10-17 DIAGNOSIS — Z34.03 ENCOUNTER FOR SUPERVISION OF NORMAL FIRST PREGNANCY IN THIRD TRIMESTER: Primary | ICD-10-CM

## 2022-10-17 PROCEDURE — 99213 PR OFFICE/OUTPT VISIT, EST, LEVL III, 20-29 MIN: ICD-10-PCS | Mod: TH,S$GLB,, | Performed by: OBSTETRICS & GYNECOLOGY

## 2022-10-17 PROCEDURE — 99213 OFFICE O/P EST LOW 20 MIN: CPT | Mod: TH,S$GLB,, | Performed by: OBSTETRICS & GYNECOLOGY

## 2022-10-17 NOTE — PROGRESS NOTES
Subjective:       Patient ID: Luciana Jacob is a 22 y.o.  at 36w1d     Chief Complaint:  Routine Prenatal Visit        History of Present Illness  No complaints. Reports normal sx. Labs and history reviewed with pt.         Review of Systems  Denies n/v, f/c, dysuria, contractions,   VD, VB, round ligament pain, headaches, preE ROS       Objective:     Vitals:    10/17/22 1105   BP: 106/79   Pulse: 80     Wt Readings from Last 3 Encounters:   10/17/22 98.4 kg (217 lb)   10/10/22 99.1 kg (218 lb 6 oz)   10/10/22 99.1 kg (218 lb 6 oz)       nad  NCAT  pupils normal size  Skin nml no rashes or lesions  No resp distress, resp even and unlabored  Gravid nt, no rebound no guarding  No cyanosis or clubbing, edema appropriate for pregn    FHT: 150's  CVX: 3 75 h    Assessment:        1. Encounter for supervision of normal first pregnancy in third trimester                Plan:     Encouraged PNV  Pain, fever, bleeding precautions   RTC 1 weeks

## 2022-10-18 LAB
APPEARANCE, UA: CLEAR
BACTERIA SPEC CULT: ABNORMAL /HPF
BILIRUB UR QL STRIP: NEGATIVE MG/DL
CALCIUM OXALATE CRYSTALS, UA: ABNORMAL /LPF
COLOR UR: YELLOW
GLUCOSE (UA): NORMAL MG/DL
HGB UR QL STRIP: NEGATIVE /UL
KETONES UR QL STRIP: ABNORMAL MG/DL
LEUKOCYTE ESTERASE UR QL STRIP: 25 /UL
NITRITE UR QL STRIP: NEGATIVE
PH UR STRIP: 6 PH (ref 5–9)
PROT UR QL STRIP: ABNORMAL MG/DL
RBC #/AREA URNS HPF: ABNORMAL /HPF (ref 0–2)
SERVICE COMMENT 03: ABNORMAL
SP GR UR STRIP: 1.03 (ref 1–1.03)
SPECIMEN COLLECTION METHOD, URINE: ABNORMAL
SQUAMOUS EPITHELIAL, UA: ABNORMAL /LPF
UROBILINOGEN UR STRIP-ACNC: 4 MG/DL
WBC #/AREA URNS HPF: ABNORMAL /HPF (ref 0–5)

## 2022-10-21 ENCOUNTER — OUTSIDE PLACE OF SERVICE (OUTPATIENT)
Dept: OBSTETRICS AND GYNECOLOGY | Facility: CLINIC | Age: 22
End: 2022-10-21
Payer: MEDICAID

## 2022-10-21 LAB
APPEARANCE, UA: ABNORMAL
BACTERIA SPEC CULT: ABNORMAL /HPF
BASOPHILS NFR BLD: 0.2 % (ref 0–3)
BILIRUB UR QL STRIP: NEGATIVE MG/DL
COLOR UR: ABNORMAL
EOSINOPHIL NFR BLD: 0.6 % (ref 1–3)
ERYTHROCYTE [DISTWIDTH] IN BLOOD BY AUTOMATED COUNT: 13.8 % (ref 12.5–18)
GLUCOSE (UA): NORMAL MG/DL
HCT VFR BLD AUTO: 37.4 % (ref 37–47)
HGB BLD-MCNC: 11.9 G/DL (ref 12–16)
HGB UR QL STRIP: NEGATIVE /UL
KETONES UR QL STRIP: NEGATIVE MG/DL
LEUKOCYTE ESTERASE UR QL STRIP: 25 /UL
LYMPHOCYTES NFR BLD: 16.5 % (ref 25–40)
MCH RBC QN AUTO: 27.2 PG (ref 27–31.2)
MCHC RBC AUTO-ENTMCNC: 31.8 G/DL (ref 31.8–35.4)
MCV RBC AUTO: 85.6 FL (ref 80–97)
MONOCYTES NFR BLD: 8.8 % (ref 1–15)
NEUTROPHILS # BLD AUTO: 9.57 10*3/UL (ref 1.8–7.7)
NEUTROPHILS NFR BLD: 73.3 % (ref 37–80)
NITRITE UR QL STRIP: NEGATIVE
NUCLEATED RED BLOOD CELLS: 0 %
PH UR STRIP: 6.5 PH (ref 5–9)
PLATELETS: 290 10*3/UL (ref 142–424)
PROT UR QL STRIP: NEGATIVE MG/DL
RBC # BLD AUTO: 4.37 10*6/UL (ref 4.2–5.4)
RBC #/AREA URNS HPF: ABNORMAL /HPF (ref 0–2)
RPR: NON REACTIVE
SERVICE COMMENT 03: ABNORMAL
SP GR UR STRIP: 1.01 (ref 1–1.03)
SPECIMEN COLLECTION METHOD, URINE: ABNORMAL
SQUAMOUS EPITHELIAL, UA: ABNORMAL /LPF
UROBILINOGEN UR STRIP-ACNC: NORMAL MG/DL
WBC # BLD: 13.1 10*3/UL (ref 4.6–10.2)
WBC #/AREA URNS HPF: ABNORMAL /HPF (ref 0–5)

## 2022-10-21 PROCEDURE — 0502F PR SUBSEQUENT PRENATAL CARE: ICD-10-PCS | Mod: ,,, | Performed by: OBSTETRICS & GYNECOLOGY

## 2022-10-21 PROCEDURE — 0502F SUBSEQUENT PRENATAL CARE: CPT | Mod: ,,, | Performed by: OBSTETRICS & GYNECOLOGY

## 2022-10-21 PROCEDURE — 59409 OBSTETRICAL CARE: CPT | Mod: AT,,, | Performed by: OBSTETRICS & GYNECOLOGY

## 2022-10-21 PROCEDURE — 59409 PR OBSTETRICAL CARE,VAG DELIV ONLY: ICD-10-PCS | Mod: AT,,, | Performed by: OBSTETRICS & GYNECOLOGY

## 2022-10-22 LAB
HCT VFR BLD AUTO: 35.3 % (ref 37–47)
HGB BLD-MCNC: 11.1 G/DL (ref 12–16)

## 2022-10-22 PROCEDURE — 99238 HOSP IP/OBS DSCHRG MGMT 30/<: CPT | Mod: ,,, | Performed by: OBSTETRICS & GYNECOLOGY

## 2022-10-22 PROCEDURE — 99238 PR HOSPITAL DISCHARGE DAY,<30 MIN: ICD-10-PCS | Mod: ,,, | Performed by: OBSTETRICS & GYNECOLOGY

## 2022-12-02 ENCOUNTER — POSTPARTUM VISIT (OUTPATIENT)
Dept: OBSTETRICS AND GYNECOLOGY | Facility: CLINIC | Age: 22
End: 2022-12-02
Payer: MEDICAID

## 2022-12-02 ENCOUNTER — PATIENT MESSAGE (OUTPATIENT)
Dept: OBSTETRICS AND GYNECOLOGY | Facility: CLINIC | Age: 22
End: 2022-12-02

## 2022-12-02 PROCEDURE — 59430 PR CARE AFTER DELIVERY ONLY: ICD-10-PCS | Mod: S$GLB,,, | Performed by: OBSTETRICS & GYNECOLOGY

## 2022-12-06 ENCOUNTER — OFFICE VISIT (OUTPATIENT)
Dept: PRIMARY CARE CLINIC | Facility: CLINIC | Age: 22
End: 2022-12-06
Payer: MEDICAID

## 2022-12-06 VITALS
RESPIRATION RATE: 18 BRPM | BODY MASS INDEX: 32.49 KG/M2 | WEIGHT: 195 LBS | SYSTOLIC BLOOD PRESSURE: 117 MMHG | HEART RATE: 68 BPM | HEIGHT: 65 IN | DIASTOLIC BLOOD PRESSURE: 64 MMHG | OXYGEN SATURATION: 100 %

## 2022-12-06 DIAGNOSIS — Z13.29 THYROID DISORDER SCREEN: ICD-10-CM

## 2022-12-06 DIAGNOSIS — R35.0 URINE FREQUENCY: ICD-10-CM

## 2022-12-06 DIAGNOSIS — Z00.00 ANNUAL PHYSICAL EXAM: Primary | ICD-10-CM

## 2022-12-06 PROCEDURE — 1159F MED LIST DOCD IN RCRD: CPT | Mod: CPTII,S$GLB,, | Performed by: NURSE PRACTITIONER

## 2022-12-06 PROCEDURE — 99395 PR PREVENTIVE VISIT,EST,18-39: ICD-10-PCS | Mod: S$GLB,,, | Performed by: NURSE PRACTITIONER

## 2022-12-06 PROCEDURE — 3074F PR MOST RECENT SYSTOLIC BLOOD PRESSURE < 130 MM HG: ICD-10-PCS | Mod: CPTII,S$GLB,, | Performed by: NURSE PRACTITIONER

## 2022-12-06 PROCEDURE — 3008F BODY MASS INDEX DOCD: CPT | Mod: CPTII,S$GLB,, | Performed by: NURSE PRACTITIONER

## 2022-12-06 PROCEDURE — 3008F PR BODY MASS INDEX (BMI) DOCUMENTED: ICD-10-PCS | Mod: CPTII,S$GLB,, | Performed by: NURSE PRACTITIONER

## 2022-12-06 PROCEDURE — 1160F PR REVIEW ALL MEDS BY PRESCRIBER/CLIN PHARMACIST DOCUMENTED: ICD-10-PCS | Mod: CPTII,S$GLB,, | Performed by: NURSE PRACTITIONER

## 2022-12-06 PROCEDURE — 1160F RVW MEDS BY RX/DR IN RCRD: CPT | Mod: CPTII,S$GLB,, | Performed by: NURSE PRACTITIONER

## 2022-12-06 PROCEDURE — 1159F PR MEDICATION LIST DOCUMENTED IN MEDICAL RECORD: ICD-10-PCS | Mod: CPTII,S$GLB,, | Performed by: NURSE PRACTITIONER

## 2022-12-06 PROCEDURE — 3078F PR MOST RECENT DIASTOLIC BLOOD PRESSURE < 80 MM HG: ICD-10-PCS | Mod: CPTII,S$GLB,, | Performed by: NURSE PRACTITIONER

## 2022-12-06 PROCEDURE — 99395 PREV VISIT EST AGE 18-39: CPT | Mod: S$GLB,,, | Performed by: NURSE PRACTITIONER

## 2022-12-06 PROCEDURE — 3074F SYST BP LT 130 MM HG: CPT | Mod: CPTII,S$GLB,, | Performed by: NURSE PRACTITIONER

## 2022-12-06 PROCEDURE — 3078F DIAST BP <80 MM HG: CPT | Mod: CPTII,S$GLB,, | Performed by: NURSE PRACTITIONER

## 2022-12-06 NOTE — PATIENT INSTRUCTIONS
RTC in 3 months for F/U or sooner if needed.    Keep appts with specialists as scheduled.    Instructed patient to report to nearest ER or call 911 if begins to have difficulty breathing, turning blue, chest pain, B/P < 80/60 or >170/100, palpitations, syncope, extreme weakness, or severe H/A. Patient verbalized understanding.      Patient Education       Yearly Physical for Adults   About this topic   Most people do not want to be sick. Having a checkup each year with your doctor is one way to help you stay healthy. You may need to see your doctor more or less often. How often you need to go to the doctor depends on your age. Your family and medical history also play a role in how often you need to go to the doctor. Going to see your doctor on a routine basis can help you find problems early or even before they start. This may make it easier to treat or cure your problem.  General   Your doctor will talk about many things during your checkup. Your doctor may ask about:  Your medical and family history.  All the drugs you are taking. Be sure to include all prescription, over the counter, and herbal supplements. Tell the doctor if you have any drug allergy. Bring a list of drugs you take with you.  How you are feeling and if you are having any problems.  Risky behaviors like smoking, drinking alcohol, using illegal drugs, not wearing seatbelts, having unprotected sex, etc.  Your doctor will do a physical exam and may check your:  Height and weight  Blood pressure  Reflexes  Memory  Vision  Hearing  Your doctor may order:  Lab tests  ECG to check your heart rhythm  X-rays  Tests or treatments based on your exam  What lifestyle changes are needed?   Your doctor may suggest you make changes to your lifestyle at this visit. The doctor may talk with you about being more active or lowering stress levels. Ask your doctor what you need to do.  What drugs may be needed?   Your doctor may order drugs or vaccines to protect you  from illnesses.  What changes to diet are needed?   Talk to your doctor to see if any changes are needed to your diet.  When do I need to call the doctor?   Call your doctor if you need to learn about any test results. Together you can make a plan for more care.  Helpful tips   Make a list of questions for your doctor before you go. This will help you remember to ask about any concerns. Write down any answers from your doctor so you can look over them after your visit.   Tell your doctor about any changes in your body or health since your last visit.  Ask your doctor about any screening tests you need.  Where can I learn more?   American Academy [employer]  http://familydoctor.org/familydoctor/en/prevention-wellness/staying-healthy/healthy-living/preventive-services-for-healthy-living.printerview.html   Centers for Disease Control  http://www.cdc.gov/family/checkup/   Last Reviewed Date   2019-04-22  Consumer Information Use and Disclaimer   This information is not specific medical advice and does not replace information you receive from your health care provider. This is only a brief summary of general information. It does NOT include all information about conditions, illnesses, injuries, tests, procedures, treatments, therapies, discharge instructions or life-style choices that may apply to you. You must talk with your health care provider for complete information about your health and treatment options. This information should not be used to decide whether or not to accept your health care providers advice, instructions or recommendations. Only your health care provider has the knowledge and training to provide advice that is right for you.  Copyright   Copyright © 2021 [employer] and its affiliates and/or licensors. All rights reserved.

## 2022-12-06 NOTE — PROGRESS NOTES
Subjective:       Patient ID: Luciana Jacob is a 22 y.o. female.    Chief Complaint: Annual Exam      23 y/o female in for annual wellness exam.    Had a baby girl on 10/21/22, delivered by Dr. Sanchez for Dr. Sparks due to him being out. Last seen on 12/02/22 by Dr. Sparks and all was well. She will be getting a nexplanon inserted when it comes in.    Feels well with no new issues or concerns.    GERD - No longer having excessive burping or gas, no longer taking pantoprazole.    Denies smoking or drinking.                  History reviewed. No pertinent past medical history.    Past Surgical History:   Procedure Laterality Date    KNEE SURGERY Left 2017       Family History   Problem Relation Age of Onset    Pancreatic cancer Paternal Grandmother        Social History     Tobacco Use    Smoking status: Never    Smokeless tobacco: Never   Substance Use Topics    Alcohol use: Not Currently    Drug use: Never       Patient Active Problem List   Diagnosis    Gastroesophageal reflux disease with esophagitis without hemorrhage       Immunization History   Administered Date(s) Administered    COVID-19, MRNA, LN-S, PF (Pfizer) (Purple Cap) 08/03/2021, 08/24/2021    Tdap 09/08/2022           Review of Systems   Constitutional:  Negative for activity change, appetite change, chills, diaphoresis, fatigue and fever.   HENT:  Negative for sinus pain, tinnitus and trouble swallowing.    Eyes:  Negative for visual disturbance.   Respiratory:  Negative for cough, chest tightness, shortness of breath and wheezing.    Cardiovascular:  Negative for chest pain, palpitations and leg swelling.   Gastrointestinal:  Negative for abdominal distention, abdominal pain, blood in stool, constipation, diarrhea, nausea, rectal pain and vomiting.   Endocrine: Negative for cold intolerance, heat intolerance, polydipsia, polyphagia and polyuria.   Genitourinary:  Negative for decreased urine volume, dysuria, frequency, hematuria and urgency.  "  Musculoskeletal:  Negative for gait problem and myalgias.   Skin:  Negative for color change and rash.   Neurological:  Negative for dizziness, syncope, weakness, light-headedness, numbness and headaches.   Hematological:  Negative for adenopathy. Does not bruise/bleed easily.   Psychiatric/Behavioral:  Negative for behavioral problems, confusion and dysphoric mood. The patient is not nervous/anxious.    Objective:     Vitals:    12/06/22 1415   BP: 117/64   BP Location: Left arm   Patient Position: Sitting   BP Method: Medium (Automatic)   Pulse: 68   Resp: 18   SpO2: 100%   Weight: 88.5 kg (195 lb)   Height: 5' 5" (1.651 m)       Physical Exam  Vitals and nursing note reviewed.   Constitutional:       Appearance: Normal appearance.   HENT:      Head: Normocephalic and atraumatic.      Nose: Nose normal.      Mouth/Throat:      Mouth: Mucous membranes are moist.      Pharynx: Oropharynx is clear.   Eyes:      Extraocular Movements: Extraocular movements intact.      Conjunctiva/sclera: Conjunctivae normal.      Pupils: Pupils are equal, round, and reactive to light.   Neck:      Thyroid: No thyromegaly or thyroid tenderness.   Cardiovascular:      Rate and Rhythm: Normal rate and regular rhythm.      Pulses: Normal pulses.      Heart sounds: Normal heart sounds.   Pulmonary:      Effort: Pulmonary effort is normal.      Breath sounds: Normal breath sounds.   Abdominal:      General: Bowel sounds are normal. There is no distension.      Palpations: Abdomen is soft.      Tenderness: There is no abdominal tenderness.   Musculoskeletal:         General: Normal range of motion.      Cervical back: Normal range of motion and neck supple.      Right lower leg: No edema.      Left lower leg: No edema.   Skin:     General: Skin is warm and dry.      Capillary Refill: Capillary refill takes less than 2 seconds.   Neurological:      Mental Status: She is alert and oriented to person, place, and time.   Psychiatric:         " Mood and Affect: Mood and affect normal.         Behavior: Behavior normal. Behavior is cooperative.         Thought Content: Thought content normal.         Judgment: Judgment normal.       Orders Only on 10/22/2022   Component Date Value Ref Range Status    Hemoglobin 10/22/2022 11.1 (L)  12.0 - 16.0 g/dL Final    Hematocrit 10/22/2022 35.3 (L)  37.0 - 47.0 % Final   Orders Only on 10/21/2022   Component Date Value Ref Range Status    RPR 10/21/2022 Non Reactive  NR Final   Orders Only on 10/21/2022   Component Date Value Ref Range Status    WBC 10/21/2022 13.1 (H)  4.6 - 10.2 10*3/uL Final    RBC 10/21/2022 4.37  4.2 - 5.4 10*6/uL Final    Hemoglobin 10/21/2022 11.9 (L)  12.0 - 16.0 g/dL Final    Hematocrit 10/21/2022 37.4  37.0 - 47.0 % Final    MCV 10/21/2022 85.6  80 - 97 fL Final    MCH 10/21/2022 27.2  27.0 - 31.2 pg Final    MCHC 10/21/2022 31.8  31.8 - 35.4 g/dL Final    RDW RBC Auto-Rto 10/21/2022 13.8  12.5 - 18.0 % Final    Platelets 10/21/2022 290  142 - 424 10*3/uL Final    Neutrophils 10/21/2022 73.3  37 - 80 % Final    Lymphocytes 10/21/2022 16.5 (L)  25 - 40 % Final    Monocytes 10/21/2022 8.8  1 - 15 % Final    Eosinophils 10/21/2022 0.6 (L)  1 - 3 % Final    Basophils 10/21/2022 0.2  0 - 3 % Final    nRBC# 10/21/2022 0.0  % Final    Neutrophils Absolute 10/21/2022 9.57 (H)  1.8 - 7.7 10*3/uL Final   Orders Only on 10/21/2022   Component Date Value Ref Range Status    Specimen Collection Method 10/21/2022 Cl Catch Mid Stream   Final    Color, UA 10/21/2022 Pale Yellow  Yellow Final    Appearance, UA 10/21/2022 Slightly Cloudy (A)  Clear Final    pH, UA 10/21/2022 6.5  5.0 - 9.0 pH Final    Specific Gravity, UA 10/21/2022 1.010  1.000 - 1.030 Final    Protein, UA 10/21/2022 Negative  Negative mg/dL Final    Glucose, UA 10/21/2022 Normal  Normal mg/dL Final    Ketones, UA 10/21/2022 Negative  Negative mg/dL Final    Occult Blood UA 10/21/2022 Negative  Negative /uL Final    Nitrite, UA 10/21/2022  Negative  Negative Final    Bilirubin (UA) 10/21/2022 Negative  Negative mg/dL Final    Urobilinogen, UA 10/21/2022 Normal  Normal mg/dL Final    Leukocytes, UA 10/21/2022 25 (A)  Negative /uL Final    RBC, UA 10/21/2022 None  0 - 2 /HPF Final    WBC, UA 10/21/2022 5-10 (A)  0 - 5 /HPF Final    Squam Epithel, UA 10/21/2022 1+ Few  /LPF Final    Bacteria 10/21/2022 +/- Rare  Few/HPF /HPF Final    Service Comment 03 10/21/2022 No, Criteria Not Met   Final   Orders Only on 10/18/2022   Component Date Value Ref Range Status    Specimen Collection Method 10/18/2022 Cl Catch Mid Stream   Final    Color, UA 10/18/2022 Yellow  Yellow Final    Appearance, UA 10/18/2022 CLEAR  Clear Final    pH, UA 10/18/2022 6.0  5.0 - 9.0 pH Final    Specific Gravity, UA 10/18/2022 1.030  1.000 - 1.030 Final    Protein, UA 10/18/2022 Trace (A)  Negative mg/dL Final    Glucose, UA 10/18/2022 Normal  Normal mg/dL Final    Ketones, UA 10/18/2022 Trace (A)  Negative mg/dL Final    Occult Blood UA 10/18/2022 Negative  Negative /uL Final    Nitrite, UA 10/18/2022 Negative  Negative Final    Bilirubin (UA) 10/18/2022 Negative  Negative mg/dL Final    Urobilinogen, UA 10/18/2022 4.0 (A)  Normal mg/dL Final    Leukocytes, UA 10/18/2022 25 (A)  Negative /uL Final    RBC, UA 10/18/2022 None  0 - 2 /HPF Final    WBC, UA 10/18/2022 2-4  0 - 5 /HPF Final    Squam Epithel, UA 10/18/2022 1+ Few  /LPF Final    Ca Oxalate Jeny, UA 10/18/2022 1+ Few (A)  None Seen /LPF Final    Bacteria 10/18/2022 1+ Few  Few/HPF /HPF Final    Service Comment 03 10/18/2022 No, Criteria Not Met   Final   Orders Only on 09/28/2022   Component Date Value Ref Range Status    Specimen Collection Method 09/28/2022 Cl Catch Mid Stream   Final    Color,  09/28/2022 Leanne (A)  Yellow Final    Appearance, UA 09/28/2022 Clear  Clear Final    pH,  09/28/2022 6.0  5.0 - 9.0 pH Final    Specific Gravity,  09/28/2022 1.020  1.000 - 1.030 Final    Protein,  09/28/2022 Trace (A)   Negative mg/dL Final    Glucose, UA 09/28/2022 Normal  Normal mg/dL Final    Ketones, UA 09/28/2022 Negative  Negative mg/dL Final    Occult Blood UA 09/28/2022 Negative  Negative /uL Final    Nitrite, UA 09/28/2022 Negative  Negative Final    Bilirubin (UA) 09/28/2022 Negative  Negative mg/dL Final    Urobilinogen, UA 09/28/2022 4.0 (A)  Normal mg/dL Final    Leukocytes, UA 09/28/2022 Negative  Negative /uL Final   Routine Prenatal on 09/22/2022   Component Date Value Ref Range Status    Source 09/22/2022 SWAB   Final    Chlamydia 09/22/2022 NEGATIVE  NEGATIVE Final    Gonorrhea 09/22/2022 NEGATIVE  NEGATIVE Final    Group B Strep Molecular 09/22/2022 NEGATIVE  NEGATIVE Final    Penicillin Allergic 09/22/2022 NO   Final    Source 09/22/2022 URINE   Final    TRICHOMONAS AMPLIFIED 09/22/2022 NEGATIVE  NEGATIVE Final   Orders Only on 09/19/2022   Component Date Value Ref Range Status    Urine Culture, Routine 09/19/2022 No growth in 18-24 hoursNo growth in 36-48 hours   Final   Orders Only on 09/19/2022   Component Date Value Ref Range Status    Specimen Collection Method 09/19/2022 Cl Catch Mid Stream   Final    Color, UA 09/19/2022 Pale Yellow  Yellow Final    Appearance, UA 09/19/2022 CLEAR  Clear Final    pH, UA 09/19/2022 7.0  5.0 - 9.0 pH Final    Specific Gravity, UA 09/19/2022 1.010  1.000 - 1.030 Final    Protein, UA 09/19/2022 Negative  Negative mg/dL Final    Glucose, UA 09/19/2022 Normal  Normal mg/dL Final    Ketones, UA 09/19/2022 Negative  Negative mg/dL Final    Occult Blood UA 09/19/2022 Negative  Negative /uL Final    Nitrite, UA 09/19/2022 Negative  Negative Final    Bilirubin (UA) 09/19/2022 Negative  Negative mg/dL Final    Urobilinogen, UA 09/19/2022 Normal  Normal mg/dL Final    Leukocytes, UA 09/19/2022 100 (A)  Negative /uL Final    RBC, UA 09/19/2022 0-2  0 - 2 /HPF Final    WBC, UA 09/19/2022 0-2  0 - 5 /HPF Final    Squam Epithel, UA 09/19/2022 1+ Few  /LPF Final    Bacteria 09/19/2022 2+  Moderate (A)  Few/HPF /HPF Final    Service Comment 03 09/19/2022 Yes, Criteria Met (A)   Final   Procedure visit on 07/18/2022   Component Date Value Ref Range Status    Interpretation 07/18/2022 Test Not Performed   Final   There may be more visits with results that are not included.         Assessment:      1. Annual physical exam    2. Urine frequency    3. Thyroid disorder screen            Plan:     Annual physical exam  Comments:  Will review labs and determine POC based on results  Orders:  -     CBC Auto Differential; Future; Expected date: 12/11/2022  -     Comprehensive Metabolic Panel; Future; Expected date: 12/11/2022  -     Lipid Panel; Future; Expected date: 12/11/2022  -     TSH w/reflex to FT4; Future; Expected date: 12/11/2022    Urine frequency  -     Urinalysis, Reflex to Urine Culture Urine, Clean Catch; Future; Expected date: 12/11/2022    Thyroid disorder screen  -     TSH w/reflex to FT4; Future; Expected date: 12/11/2022           No current outpatient medications on file.     No current facility-administered medications for this visit.       Medications Discontinued During This Encounter   Medication Reason    cephALEXin (KEFLEX) 500 MG capsule Therapy completed    pantoprazole (PROTONIX) 20 MG tablet Patient no longer taking    fluticasone propionate (FLONASE) 50 mcg/actuation nasal spray Patient no longer taking    nystatin (MYCOSTATIN) powder Patient no longer taking    prenatal vit,nj 74/iron/folic (PRENATAL VITAMIN 1+1 ORAL) Patient no longer taking         Health Maintenance   Topic Date Due    Hepatitis C Screening  Never done    HPV Vaccines (1 - 2-dose series) Never done    TETANUS VACCINE  09/08/2032    Lipid Panel  Completed       Patient Instructions   RTC in 3 months for F/U or sooner if needed.    Keep appts with specialists as scheduled.    Instructed patient to report to nearest ER or call 911 if begins to have difficulty breathing, turning blue, chest pain, B/P < 80/60 or  >170/100, palpitations, syncope, extreme weakness, or severe H/A. Patient verbalized understanding.      Patient Education       Yearly Physical for Adults   About this topic   Most people do not want to be sick. Having a checkup each year with your doctor is one way to help you stay healthy. You may need to see your doctor more or less often. How often you need to go to the doctor depends on your age. Your family and medical history also play a role in how often you need to go to the doctor. Going to see your doctor on a routine basis can help you find problems early or even before they start. This may make it easier to treat or cure your problem.  General   Your doctor will talk about many things during your checkup. Your doctor may ask about:  Your medical and family history.  All the drugs you are taking. Be sure to include all prescription, over the counter, and herbal supplements. Tell the doctor if you have any drug allergy. Bring a list of drugs you take with you.  How you are feeling and if you are having any problems.  Risky behaviors like smoking, drinking alcohol, using illegal drugs, not wearing seatbelts, having unprotected sex, etc.  Your doctor will do a physical exam and may check your:  Height and weight  Blood pressure  Reflexes  Memory  Vision  Hearing  Your doctor may order:  Lab tests  ECG to check your heart rhythm  X-rays  Tests or treatments based on your exam  What lifestyle changes are needed?   Your doctor may suggest you make changes to your lifestyle at this visit. The doctor may talk with you about being more active or lowering stress levels. Ask your doctor what you need to do.  What drugs may be needed?   Your doctor may order drugs or vaccines to protect you from illnesses.  What changes to diet are needed?   Talk to your doctor to see if any changes are needed to your diet.  When do I need to call the doctor?   Call your doctor if you need to learn about any test results. Together  you can make a plan for more care.  Helpful tips   Make a list of questions for your doctor before you go. This will help you remember to ask about any concerns. Write down any answers from your doctor so you can look over them after your visit.   Tell your doctor about any changes in your body or health since your last visit.  Ask your doctor about any screening tests you need.  Where can I learn more?   [employer] Academy [employer]  http://familydoctor.org/familydoctor/en/prevention-wellness/staying-healthy/healthy-living/preventive-services-for-healthy-living.printerview.html   Centers for Disease Control  http://www.cdc.gov/family/checkup/   Last Reviewed Date   2019-04-22  Consumer Information Use and Disclaimer   This information is not specific medical advice and does not replace information you receive from your health care provider. This is only a brief summary of general information. It does NOT include all information about conditions, illnesses, injuries, tests, procedures, treatments, therapies, discharge instructions or life-style choices that may apply to you. You must talk with your health care provider for complete information about your health and treatment options. This information should not be used to decide whether or not to accept your health care providers advice, instructions or recommendations. Only your health care provider has the knowledge and training to provide advice that is right for you.  Copyright   Copyright © 2021 [employer] and its affiliates and/or licensors. All rights reserved.      Risks, benefits, and alternatives discussed with patient, Patient verbalized understanding of discussed plan of care. Asked patient if any further questions, answered no.    Future Appointments   Date Time Provider Department Center   3/6/2023  2:20 PM [facility]

## 2023-01-09 ENCOUNTER — OFFICE VISIT (OUTPATIENT)
Dept: OBSTETRICS AND GYNECOLOGY | Facility: CLINIC | Age: 23
End: 2023-01-09
Payer: MEDICAID

## 2023-01-09 VITALS
BODY MASS INDEX: 32.78 KG/M2 | WEIGHT: 197 LBS | DIASTOLIC BLOOD PRESSURE: 86 MMHG | SYSTOLIC BLOOD PRESSURE: 134 MMHG | HEART RATE: 65 BPM

## 2023-01-09 DIAGNOSIS — Z30.017 NEXPLANON INSERTION: Primary | ICD-10-CM

## 2023-01-09 PROCEDURE — 11981 INSERTION DRUG DLVR IMPLANT: CPT | Mod: S$GLB,,, | Performed by: OBSTETRICS & GYNECOLOGY

## 2023-01-09 PROCEDURE — 3079F DIAST BP 80-89 MM HG: CPT | Mod: CPTII,S$GLB,, | Performed by: OBSTETRICS & GYNECOLOGY

## 2023-01-09 PROCEDURE — 99499 NO LOS: ICD-10-PCS | Mod: S$GLB,,, | Performed by: OBSTETRICS & GYNECOLOGY

## 2023-01-09 PROCEDURE — 11981 INSERTION OF NEXPLANON: ICD-10-PCS | Mod: S$GLB,,, | Performed by: OBSTETRICS & GYNECOLOGY

## 2023-01-09 PROCEDURE — 3075F PR MOST RECENT SYSTOLIC BLOOD PRESS GE 130-139MM HG: ICD-10-PCS | Mod: CPTII,S$GLB,, | Performed by: OBSTETRICS & GYNECOLOGY

## 2023-01-09 PROCEDURE — 99499 UNLISTED E&M SERVICE: CPT | Mod: S$GLB,,, | Performed by: OBSTETRICS & GYNECOLOGY

## 2023-01-09 PROCEDURE — 3008F PR BODY MASS INDEX (BMI) DOCUMENTED: ICD-10-PCS | Mod: CPTII,S$GLB,, | Performed by: OBSTETRICS & GYNECOLOGY

## 2023-01-09 PROCEDURE — 3079F PR MOST RECENT DIASTOLIC BLOOD PRESSURE 80-89 MM HG: ICD-10-PCS | Mod: CPTII,S$GLB,, | Performed by: OBSTETRICS & GYNECOLOGY

## 2023-01-09 PROCEDURE — 3075F SYST BP GE 130 - 139MM HG: CPT | Mod: CPTII,S$GLB,, | Performed by: OBSTETRICS & GYNECOLOGY

## 2023-01-09 PROCEDURE — 3008F BODY MASS INDEX DOCD: CPT | Mod: CPTII,S$GLB,, | Performed by: OBSTETRICS & GYNECOLOGY

## 2023-01-09 NOTE — PROCEDURES
Insertion of Nexplanon    Date/Time: 1/9/2023 1:00 PM  Performed by: En Sparks MD  Authorized by: En Sparks MD     Consent obtained:  Verbal  Consent given by:  Patient  Patient questions answered: yes    Patient agrees, verbalizes understanding, and wants to proceed: yes    Instructions and paperwork completed: yes    Local anesthetic:  Lidocaine with epinephrine  The site was cleaned and prepped in a sterile fashion: yes    Left/right:  Left   68 mg etonogestreL 68 mg  Preloaded Implanon trocar was placed subdermally: yes    Visualization of implant was obtained: yes    Nexplanon was inserted and trocar removed: yes    Visualization of notch in stilette and palpitation of device: yes    Palpitation confirms placement by provider and patient: yes

## 2023-02-02 ENCOUNTER — PATIENT MESSAGE (OUTPATIENT)
Dept: OBSTETRICS AND GYNECOLOGY | Facility: CLINIC | Age: 23
End: 2023-02-02
Payer: MEDICAID

## 2023-03-31 ENCOUNTER — PATIENT MESSAGE (OUTPATIENT)
Dept: FAMILY MEDICINE | Facility: CLINIC | Age: 23
End: 2023-03-31
Payer: MEDICAID

## 2023-04-10 ENCOUNTER — OFFICE VISIT (OUTPATIENT)
Dept: OBSTETRICS AND GYNECOLOGY | Facility: CLINIC | Age: 23
End: 2023-04-10
Payer: MEDICAID

## 2023-04-10 VITALS
HEART RATE: 83 BPM | DIASTOLIC BLOOD PRESSURE: 78 MMHG | BODY MASS INDEX: 29.79 KG/M2 | WEIGHT: 179 LBS | SYSTOLIC BLOOD PRESSURE: 133 MMHG

## 2023-04-10 DIAGNOSIS — Z20.2 CHLAMYDIA CONTACT, TREATED: Primary | ICD-10-CM

## 2023-04-10 PROCEDURE — 3008F PR BODY MASS INDEX (BMI) DOCUMENTED: ICD-10-PCS | Mod: CPTII,S$GLB,, | Performed by: OBSTETRICS & GYNECOLOGY

## 2023-04-10 PROCEDURE — 3075F SYST BP GE 130 - 139MM HG: CPT | Mod: CPTII,S$GLB,, | Performed by: OBSTETRICS & GYNECOLOGY

## 2023-04-10 PROCEDURE — 3078F PR MOST RECENT DIASTOLIC BLOOD PRESSURE < 80 MM HG: ICD-10-PCS | Mod: CPTII,S$GLB,, | Performed by: OBSTETRICS & GYNECOLOGY

## 2023-04-10 PROCEDURE — 99213 PR OFFICE/OUTPT VISIT, EST, LEVL III, 20-29 MIN: ICD-10-PCS | Mod: S$GLB,,, | Performed by: OBSTETRICS & GYNECOLOGY

## 2023-04-10 PROCEDURE — 3008F BODY MASS INDEX DOCD: CPT | Mod: CPTII,S$GLB,, | Performed by: OBSTETRICS & GYNECOLOGY

## 2023-04-10 PROCEDURE — 3075F PR MOST RECENT SYSTOLIC BLOOD PRESS GE 130-139MM HG: ICD-10-PCS | Mod: CPTII,S$GLB,, | Performed by: OBSTETRICS & GYNECOLOGY

## 2023-04-10 PROCEDURE — 3078F DIAST BP <80 MM HG: CPT | Mod: CPTII,S$GLB,, | Performed by: OBSTETRICS & GYNECOLOGY

## 2023-04-10 PROCEDURE — 1159F PR MEDICATION LIST DOCUMENTED IN MEDICAL RECORD: ICD-10-PCS | Mod: CPTII,S$GLB,, | Performed by: OBSTETRICS & GYNECOLOGY

## 2023-04-10 PROCEDURE — 99213 OFFICE O/P EST LOW 20 MIN: CPT | Mod: S$GLB,,, | Performed by: OBSTETRICS & GYNECOLOGY

## 2023-04-10 PROCEDURE — 1159F MED LIST DOCD IN RCRD: CPT | Mod: CPTII,S$GLB,, | Performed by: OBSTETRICS & GYNECOLOGY

## 2023-04-10 RX ORDER — ESTRADIOL 2 MG/1
2 TABLET ORAL DAILY
Qty: 30 TABLET | Refills: 11 | Status: SHIPPED | OUTPATIENT
Start: 2023-04-10 | End: 2024-04-09

## 2023-04-10 NOTE — PROGRESS NOTES
Pt here for abn bleeding    [facility]    Exam nml    Will give course of estrogen

## 2023-04-13 DIAGNOSIS — Z20.2 CHLAMYDIA CONTACT, TREATED: Primary | ICD-10-CM

## 2023-04-13 LAB
CHLAMYDIA: POSITIVE
GONORRHEA: NEGATIVE
SOURCE: ABNORMAL
SOURCE: NORMAL
TRICHOMONAS AMPLIFIED: NEGATIVE

## 2023-04-13 RX ORDER — DOXYCYCLINE 100 MG/1
100 CAPSULE ORAL 2 TIMES DAILY
Qty: 14 CAPSULE | Refills: 0 | Status: SHIPPED | OUTPATIENT
Start: 2023-04-13 | End: 2023-04-20

## 2023-06-01 ENCOUNTER — OFFICE VISIT (OUTPATIENT)
Dept: OBSTETRICS AND GYNECOLOGY | Facility: CLINIC | Age: 23
End: 2023-06-01
Payer: MEDICAID

## 2023-06-01 VITALS
WEIGHT: 168 LBS | DIASTOLIC BLOOD PRESSURE: 80 MMHG | HEART RATE: 84 BPM | BODY MASS INDEX: 27.96 KG/M2 | SYSTOLIC BLOOD PRESSURE: 125 MMHG

## 2023-06-01 DIAGNOSIS — Z20.2 CHLAMYDIA CONTACT, TREATED: Primary | ICD-10-CM

## 2023-06-01 PROCEDURE — 3079F DIAST BP 80-89 MM HG: CPT | Mod: CPTII,S$GLB,, | Performed by: OBSTETRICS & GYNECOLOGY

## 2023-06-01 PROCEDURE — 3008F PR BODY MASS INDEX (BMI) DOCUMENTED: ICD-10-PCS | Mod: CPTII,S$GLB,, | Performed by: OBSTETRICS & GYNECOLOGY

## 2023-06-01 PROCEDURE — 3008F BODY MASS INDEX DOCD: CPT | Mod: CPTII,S$GLB,, | Performed by: OBSTETRICS & GYNECOLOGY

## 2023-06-01 PROCEDURE — 1159F MED LIST DOCD IN RCRD: CPT | Mod: CPTII,S$GLB,, | Performed by: OBSTETRICS & GYNECOLOGY

## 2023-06-01 PROCEDURE — 3074F PR MOST RECENT SYSTOLIC BLOOD PRESSURE < 130 MM HG: ICD-10-PCS | Mod: CPTII,S$GLB,, | Performed by: OBSTETRICS & GYNECOLOGY

## 2023-06-01 PROCEDURE — 99213 PR OFFICE/OUTPT VISIT, EST, LEVL III, 20-29 MIN: ICD-10-PCS | Mod: S$GLB,,, | Performed by: OBSTETRICS & GYNECOLOGY

## 2023-06-01 PROCEDURE — 3074F SYST BP LT 130 MM HG: CPT | Mod: CPTII,S$GLB,, | Performed by: OBSTETRICS & GYNECOLOGY

## 2023-06-01 PROCEDURE — 1159F PR MEDICATION LIST DOCUMENTED IN MEDICAL RECORD: ICD-10-PCS | Mod: CPTII,S$GLB,, | Performed by: OBSTETRICS & GYNECOLOGY

## 2023-06-01 PROCEDURE — 99213 OFFICE O/P EST LOW 20 MIN: CPT | Mod: S$GLB,,, | Performed by: OBSTETRICS & GYNECOLOGY

## 2023-06-01 PROCEDURE — 3079F PR MOST RECENT DIASTOLIC BLOOD PRESSURE 80-89 MM HG: ICD-10-PCS | Mod: CPTII,S$GLB,, | Performed by: OBSTETRICS & GYNECOLOGY

## 2023-06-01 NOTE — PROGRESS NOTES
Subjective:      Patient ID: Luciana Jacob is a 22 y.o. female.    Chief Complaint:  Abdominal Pain and Vaginal Discharge      History of Present Illness  Abdominal Pain  Pertinent negatives include no arthralgias, constipation, diarrhea, dysuria, fever, hematuria, nausea or vomiting.   Vaginal Discharge  The patient's primary symptoms include vaginal discharge. The patient's pertinent negatives include no pelvic pain. Associated symptoms include abdominal pain. Pertinent negatives include no chills, constipation, diarrhea, dysuria, fever, hematuria, nausea, rash or vomiting. There is no history of menorrhagia.   Here for std recheck    GYN & OB History  No LMP recorded (lmp unknown). Patient has had an implant.   Date of Last Pap: No result found    OB History    Para Term  AB Living   1 1   1   1   SAB IAB Ectopic Multiple Live Births           1      # Outcome Date GA Lbr Thomas/2nd Weight Sex Delivery Anes PTL Lv   1  10/12/22 35w3d   F Vag-Spont   COREY       Review of Systems  Review of Systems   Constitutional:  Negative for chills and fever.   Respiratory:  Negative for shortness of breath.    Cardiovascular:  Negative for chest pain.   Gastrointestinal:  Positive for abdominal pain. Negative for blood in stool, constipation, diarrhea, nausea, vomiting and reflux.   Genitourinary:  Positive for vaginal discharge. Negative for dysmenorrhea, dyspareunia, dysuria, hematuria, hot flashes, menorrhagia, menstrual problem, pelvic pain, vaginal bleeding, postcoital bleeding and vaginal dryness.   Musculoskeletal:  Negative for arthralgias and joint swelling.   Integumentary:  Negative for rash, hair changes, breast mass, nipple discharge and breast skin changes.   Psychiatric/Behavioral:  Negative for depression. The patient is not nervous/anxious.    Breast: Negative for asymmetry, lump, mass, nipple discharge and skin changes       Objective:     Physical Exam:   Constitutional: She  appears well-developed and well-nourished. No distress.    HENT:   Head: Normocephalic and atraumatic.    Eyes: Conjunctivae and EOM are normal.    Neck: No tracheal deviation present. No thyromegaly present.    Cardiovascular:       Exam reveals no clubbing, no cyanosis and no edema.        Pulmonary/Chest: Effort normal. No respiratory distress.        Abdominal: Soft. She exhibits no distension and no mass. There is no abdominal tenderness. There is no rebound and no guarding. No hernia.     Genitourinary:    Vagina, uterus and rectum normal.      Pelvic exam was performed with patient supine.   There is no rash, tenderness, lesion or injury on the right labia. There is no rash, tenderness, lesion or injury on the left labia. Cervix is normal. Right adnexum displays no mass, no tenderness and no fullness. Left adnexum displays no mass, no tenderness and no fullness.           Musculoskeletal: Normal range of motion and moves all extremeties.        Skin: No rash noted. She is not diaphoretic. No cyanosis. Nails show no clubbing.    Psychiatric: She has a normal mood and affect. Her behavior is normal. Judgment and thought content normal.       Assessment:     1. Chlamydia contact, treated              Plan:     Std cx

## 2023-06-02 DIAGNOSIS — A59.9 TRICHOMONAS INFECTION: Primary | ICD-10-CM

## 2023-06-02 LAB
CHLAMYDIA: NEGATIVE
GONORRHEA: NEGATIVE
SOURCE: ABNORMAL
SOURCE: NORMAL
TRICHOMONAS AMPLIFIED: POSITIVE

## 2023-06-02 RX ORDER — METRONIDAZOLE 500 MG/1
2000 TABLET ORAL ONCE
Qty: 4 TABLET | Refills: 0 | Status: SHIPPED | OUTPATIENT
Start: 2023-06-02 | End: 2023-06-02

## 2024-01-09 ENCOUNTER — OFFICE VISIT (OUTPATIENT)
Dept: OBSTETRICS AND GYNECOLOGY | Facility: CLINIC | Age: 24
End: 2024-01-09

## 2024-01-09 VITALS
DIASTOLIC BLOOD PRESSURE: 75 MMHG | SYSTOLIC BLOOD PRESSURE: 112 MMHG | WEIGHT: 178 LBS | HEART RATE: 65 BPM | BODY MASS INDEX: 29.62 KG/M2

## 2024-01-09 DIAGNOSIS — Z01.419 ENCOUNTER FOR ANNUAL ROUTINE GYNECOLOGICAL EXAMINATION: Primary | ICD-10-CM

## 2024-01-09 PROCEDURE — 99395 PREV VISIT EST AGE 18-39: CPT | Mod: S$GLB,,, | Performed by: OBSTETRICS & GYNECOLOGY

## 2024-01-09 NOTE — PROGRESS NOTES
Subjective:       Patient ID: Luciana Jacob is a 23 y.o. female.    Chief Complaint:  Well Woman and Annual Exam      History of Present Illness  Pt here for gyn annual.  History and past labs reviewed with patient.    Complaints none      Review of Systems  Review of Systems   Constitutional:  Negative for chills and fever.   Respiratory:  Negative for shortness of breath.    Cardiovascular:  Negative for chest pain.   Gastrointestinal:  Negative for abdominal pain, blood in stool, constipation, diarrhea, nausea, vomiting and reflux.   Genitourinary:  Negative for dysmenorrhea, dyspareunia, dysuria, hematuria, hot flashes, menorrhagia, menstrual problem, pelvic pain, vaginal bleeding, vaginal discharge, postcoital bleeding and vaginal dryness.   Musculoskeletal:  Negative for arthralgias and joint swelling.   Integumentary:  Negative for rash, hair changes, breast mass, nipple discharge and breast skin changes.   Psychiatric/Behavioral:  Negative for depression. The patient is not nervous/anxious.    Breast: Negative for asymmetry, lump, mass, nipple discharge and skin changes          Objective:     Vitals:    01/09/24 1431   BP: 112/75   Pulse: 65   Weight: 80.7 kg (178 lb)       Physical Exam:   Constitutional: She appears well-developed and well-nourished. No distress.    HENT:   Head: Normocephalic and atraumatic.    Eyes: Conjunctivae and EOM are normal.    Neck: No tracheal deviation present. No thyromegaly present.    Cardiovascular:       Exam reveals no clubbing, no cyanosis and no edema.        Pulmonary/Chest: Effort normal. No respiratory distress.        Abdominal: Soft. She exhibits no distension and no mass. There is no abdominal tenderness. There is no rebound and no guarding. No hernia.     Genitourinary:    Vagina, uterus and rectum normal.      Pelvic exam was performed with patient supine.   There is no rash, tenderness, lesion or injury on the right labia. There is no rash,  tenderness, lesion or injury on the left labia. Cervix is normal. Right adnexum displays no mass, no tenderness and no fullness. Left adnexum displays no mass, no tenderness and no fullness.                Skin: She is not diaphoretic. No cyanosis. Nails show no clubbing.          Assessment:        1. Encounter for annual routine gynecological examination               Plan:      pap   nexplanon

## 2024-01-11 DIAGNOSIS — A59.9 TRICHIMONIASIS: Primary | ICD-10-CM

## 2024-01-11 DIAGNOSIS — A74.9 CHLAMYDIA: ICD-10-CM

## 2024-01-11 LAB
CHLAMYDIA: POSITIVE
GONORRHEA: NEGATIVE
SOURCE: ABNORMAL
SOURCE: ABNORMAL
TRICHOMONAS AMPLIFIED: POSITIVE

## 2024-01-11 RX ORDER — DOXYCYCLINE HYCLATE 100 MG
100 TABLET ORAL 2 TIMES DAILY
Qty: 14 TABLET | Refills: 0 | Status: SHIPPED | OUTPATIENT
Start: 2024-01-11 | End: 2024-01-18

## 2024-01-11 RX ORDER — METRONIDAZOLE 500 MG/1
2000 TABLET ORAL ONCE
Qty: 4 TABLET | Refills: 0 | Status: SHIPPED | OUTPATIENT
Start: 2024-01-11 | End: 2024-01-11

## 2024-01-12 LAB — Lab: NORMAL

## 2024-05-23 ENCOUNTER — CLINICAL SUPPORT (OUTPATIENT)
Dept: OBSTETRICS AND GYNECOLOGY | Facility: CLINIC | Age: 24
End: 2024-05-23
Payer: MEDICAID

## 2024-05-23 ENCOUNTER — OFFICE VISIT (OUTPATIENT)
Dept: PRIMARY CARE CLINIC | Facility: CLINIC | Age: 24
End: 2024-05-23
Payer: MEDICAID

## 2024-05-23 VITALS
HEIGHT: 65 IN | RESPIRATION RATE: 16 BRPM | OXYGEN SATURATION: 98 % | WEIGHT: 194 LBS | BODY MASS INDEX: 32.32 KG/M2 | HEART RATE: 68 BPM | SYSTOLIC BLOOD PRESSURE: 111 MMHG | DIASTOLIC BLOOD PRESSURE: 76 MMHG

## 2024-05-23 DIAGNOSIS — Z11.4 SCREENING FOR HIV (HUMAN IMMUNODEFICIENCY VIRUS): ICD-10-CM

## 2024-05-23 DIAGNOSIS — Z00.00 ANNUAL PHYSICAL EXAM: Primary | ICD-10-CM

## 2024-05-23 DIAGNOSIS — N89.8 VAGINAL DISCHARGE: ICD-10-CM

## 2024-05-23 DIAGNOSIS — Z13.1 DIABETES MELLITUS SCREENING: ICD-10-CM

## 2024-05-23 DIAGNOSIS — Z13.220 SCREENING CHOLESTEROL LEVEL: ICD-10-CM

## 2024-05-23 DIAGNOSIS — E55.9 VITAMIN D DEFICIENCY: ICD-10-CM

## 2024-05-23 DIAGNOSIS — R35.0 URINARY FREQUENCY: ICD-10-CM

## 2024-05-23 DIAGNOSIS — Z01.89 ROUTINE LAB DRAW: Primary | ICD-10-CM

## 2024-05-23 DIAGNOSIS — Z13.29 THYROID DISORDER SCREEN: ICD-10-CM

## 2024-05-23 DIAGNOSIS — Z20.2 POSSIBLE EXPOSURE TO STD: ICD-10-CM

## 2024-05-23 DIAGNOSIS — R82.90 FOUL SMELLING URINE: ICD-10-CM

## 2024-05-23 DIAGNOSIS — E66.09 CLASS 1 OBESITY DUE TO EXCESS CALORIES WITHOUT SERIOUS COMORBIDITY WITH BODY MASS INDEX (BMI) OF 32.0 TO 32.9 IN ADULT: ICD-10-CM

## 2024-05-23 LAB
25(OH)D3 SERPL-MCNC: 16 NG/ML
ABS NRBC COUNT: 0 THOU/UL (ref 0–0.01)
ABSOLUTE BASOPHIL: 0 10*3/UL (ref 0–0.3)
ABSOLUTE EOSINOPHIL: 0.1 10*3/UL (ref 0–0.6)
ABSOLUTE IMMATURE GRAN: 0.01 THOU/UL (ref 0–0.03)
ABSOLUTE LYMPHOCYTE: 2.1 10*3/UL (ref 1.2–4)
ABSOLUTE MONOCYTE: 0.4 10*3/UL (ref 0.1–0.8)
ALBUMIN SERPL BCP-MCNC: 3.7 G/DL (ref 3.4–5)
ALP SERPL-CCNC: 64 U/L (ref 45–117)
ALT SERPL W P-5'-P-CCNC: 26 U/L (ref 13–56)
ANION GAP SERPL CALC-SCNC: 3 MMOL/L (ref 3–11)
APPEARANCE, UA: ABNORMAL
AST SERPL-CCNC: 20 U/L (ref 15–37)
BACTERIA SPEC CULT: ABNORMAL /HPF
BASOPHILS NFR BLD: 0.3 % (ref 0–3)
BILIRUB SERPL-MCNC: 0.4 MG/DL (ref 0.2–1)
BILIRUB UR QL STRIP: NEGATIVE
BUN SERPL-MCNC: 8 MG/DL (ref 7–18)
BUN/CREAT SERPL: 10.66 RATIO
CALCIUM SERPL-MCNC: 9.1 MG/DL (ref 8.5–10.1)
CHLORIDE SERPL-SCNC: 107 MMOL/L (ref 98–107)
CHOLEST SERPL-MSCNC: 175 MG/DL
CO2 SERPL-SCNC: 29 MMOL/L (ref 21–32)
COLOR UR: YELLOW
CREAT SERPL-MCNC: 0.75 MG/DL (ref 0.55–1.02)
EOSINOPHIL NFR BLD: 0.8 % (ref 0–6)
ERYTHROCYTE [DISTWIDTH] IN BLOOD BY AUTOMATED COUNT: 11.9 % (ref 0–15.5)
ESTIMATED AVG GLUCOSE: 97 MG/DL
GFR ESTIMATION: > 60
GLUCOSE (UA): NEGATIVE MG/DL
GLUCOSE SERPL-MCNC: 62 MG/DL (ref 74–106)
HBA1C MFR BLD: 4.9 % (ref 4.2–6.3)
HCT VFR BLD AUTO: 41.9 % (ref 37–47)
HDLC SERPL-MCNC: 48 MG/DL
HGB BLD-MCNC: 13.5 G/DL (ref 12–16)
HGB UR QL STRIP: NEGATIVE
HIV-1 AND HIV-2 ANTIBODIES: NEGATIVE
IMMATURE GRANULOCYTES: 0.2 % (ref 0–0.43)
KETONES UR QL STRIP: NEGATIVE MG/DL
LDLC SERPL CALC-MCNC: 116.6 MG/DL
LEUKOCYTE ESTERASE UR QL STRIP: NEGATIVE LEU/UL
LYMPHOCYTES NFR BLD: 34.5 % (ref 20–45)
MCH RBC QN AUTO: 29.4 PG (ref 27–32)
MCHC RBC AUTO-ENTMCNC: 32.2 % (ref 32–36)
MCV RBC AUTO: 91.3 FL (ref 80–99)
MONOCYTES NFR BLD: 6.9 % (ref 2–10)
MUCUS URINE: ABNORMAL /LPF
NEUTROPHILS # BLD AUTO: 3.6 10*3/UL (ref 1.4–7)
NEUTROPHILS NFR BLD: 57.3 % (ref 50–80)
NITRITE UR QL STRIP: NEGATIVE
NUCLEATED RED BLOOD CELLS: 0 % (ref 0–0.2)
PH UR STRIP: 6 PH (ref 5–8)
PLATELETS: 288 10*3/UL (ref 130–400)
PMV BLD AUTO: 10.2 FL (ref 9.2–12.2)
POTASSIUM SERPL-SCNC: 4.1 MMOL/L (ref 3.5–5.1)
PROT SERPL-MCNC: 7.5 G/DL (ref 6.4–8.2)
PROT UR QL STRIP: NEGATIVE MG/DL
RBC # BLD AUTO: 4.59 10*6/UL (ref 4.2–5.4)
RBC #/AREA URNS HPF: ABNORMAL /HPF (ref 0–2)
SERVICE COMMENT 03: ABNORMAL
SODIUM BLD-SCNC: 139 MMOL/L (ref 131–143)
SP GR UR STRIP: 1.02 (ref 1–1.03)
SQUAMOUS EPITHELIAL, UA: ABNORMAL /LPF
T4 FREE SP9 P CHAL SERPL-SCNC: 0.92 NG/DL (ref 0.76–1.46)
TRIGL SERPL-MCNC: 52 MG/DL (ref 0–149)
TSH SERPL DL<=0.005 MIU/L-ACNC: 1.17 UIU/ML (ref 0.36–3.74)
UROBILINOGEN UR STRIP-ACNC: 2 MG/DL
VLDL CHOLESTEROL: 10 MG/DL
WBC # BLD: 6.2 10*3/UL (ref 4.5–10)
WBC #/AREA URNS HPF: ABNORMAL /HPF (ref 0–2)

## 2024-05-23 PROCEDURE — 1160F RVW MEDS BY RX/DR IN RCRD: CPT | Mod: CPTII,S$GLB,, | Performed by: NURSE PRACTITIONER

## 2024-05-23 PROCEDURE — 1159F MED LIST DOCD IN RCRD: CPT | Mod: CPTII,S$GLB,, | Performed by: NURSE PRACTITIONER

## 2024-05-23 PROCEDURE — 3078F DIAST BP <80 MM HG: CPT | Mod: CPTII,S$GLB,, | Performed by: NURSE PRACTITIONER

## 2024-05-23 PROCEDURE — 99395 PREV VISIT EST AGE 18-39: CPT | Mod: S$GLB,,, | Performed by: NURSE PRACTITIONER

## 2024-05-23 PROCEDURE — 3008F BODY MASS INDEX DOCD: CPT | Mod: CPTII,S$GLB,, | Performed by: NURSE PRACTITIONER

## 2024-05-23 PROCEDURE — 3074F SYST BP LT 130 MM HG: CPT | Mod: CPTII,S$GLB,, | Performed by: NURSE PRACTITIONER

## 2024-05-23 PROCEDURE — 36415 COLL VENOUS BLD VENIPUNCTURE: CPT | Mod: S$GLB,,, | Performed by: NURSE PRACTITIONER

## 2024-05-23 NOTE — PROGRESS NOTES
Subjective:       Patient ID: Luciana Jacob is a 23 y.o. female.    Chief Complaint: Annual Exam    24 y/o female in for annual wellness exam.    Ms. Lerma C/O strong smelling urine which the smell has been off and on for months. She does also also some vaginal discharge and says she had unprotected sex with a new partner two weeks ago. She tested positive for both chlamydia and trichomonas in January and received treatment of flagyl and doxycycline at that time and she says she completed both medications.    Followed by Dr. Sparks for OB/GYN. PAP UTD. Has an IUD for birth control.    Denies smoking or drinking.                    History reviewed. No pertinent past medical history.    Past Surgical History:   Procedure Laterality Date    KNEE SURGERY      KNEE SURGERY Left 2017       Family History   Problem Relation Name Age of Onset    Pancreatic cancer Paternal Grandmother      Hypertension Father         Social History     Tobacco Use    Smoking status: Never    Smokeless tobacco: Never   Substance Use Topics    Alcohol use: Not Currently    Drug use: Never       Patient Active Problem List   Diagnosis    Gastroesophageal reflux disease with esophagitis without hemorrhage       Immunization History   Administered Date(s) Administered    COVID-19, MRNA, LN-S, PF (Pfizer) (Purple Cap) 08/03/2021, 08/24/2021    DTaP 2002, 01/08/2003, 03/26/2003, 07/07/2004    DTaP / Hep B / IPV 11/15/2006    HPV 9-Valent 02/02/2017, 11/06/2018    Hep B / HiB 2002, 03/26/2003    Hepatitis A, Pediatric/Adolescent, 2 Dose 11/15/2006, 02/02/2017    Hepatitis B, Pediatric/Adolescent 2002    HiB PRP-OMP 11/15/2006    Hib-HbOC 01/08/2003, 07/07/2004    IPV 2002, 01/08/2003, 03/26/2003    Influenza 09/16/2022    Influenza - Intranasal 11/29/2010, 01/31/2013    Influenza - Quadrivalent - PF *Preferred* (6 months and older) 12/01/2004, 02/25/2008    MMR 01/07/2004    MMRV 11/15/2006    Meningococcal B,  "recombinant 11/05/2020    Meningococcal Conjugate (MCV4P) 10/10/2013, 11/06/2018    Pneumococcal Conjugate - 7 Valent 2002, 01/08/2003, 06/11/2003, 12/01/2004, 11/15/2006    Tdap 10/10/2013, 09/08/2022    Varicella 01/07/2004           Review of Systems   Constitutional:  Positive for fatigue. Negative for activity change, appetite change, chills, diaphoresis and fever.   HENT:  Negative for congestion, ear pain, sinus pain, tinnitus and trouble swallowing.    Eyes:  Negative for visual disturbance.   Respiratory:  Negative for cough, chest tightness, shortness of breath and wheezing.    Cardiovascular:  Negative for chest pain, palpitations and leg swelling.   Gastrointestinal:  Negative for abdominal distention, abdominal pain, blood in stool, constipation, diarrhea, nausea and vomiting.   Endocrine: Negative for cold intolerance, heat intolerance, polydipsia, polyphagia and polyuria.   Genitourinary:  Positive for urgency and vaginal discharge. Negative for decreased urine volume, dysuria, frequency, genital sores, hematuria, menstrual problem, pelvic pain, vaginal bleeding and vaginal pain.   Musculoskeletal:  Negative for gait problem, neck pain and neck stiffness.   Skin:  Negative for color change and rash.   Neurological:  Negative for dizziness, syncope, weakness, light-headedness, numbness and headaches.   Hematological:  Negative for adenopathy. Does not bruise/bleed easily.   Psychiatric/Behavioral:  Negative for behavioral problems, confusion and dysphoric mood. The patient is not nervous/anxious.      Objective:     Vitals:    05/23/24 0944   BP: 111/76   Pulse: 68   Resp: 16   SpO2: 98%   Weight: 88 kg (194 lb)   Height: 5' 5" (1.651 m)       Physical Exam  Vitals and nursing note reviewed.   Constitutional:       General: She is not in acute distress.     Appearance: Normal appearance. She is not diaphoretic.   HENT:      Head: Normocephalic and atraumatic.      Nose: Nose normal.      " Mouth/Throat:      Mouth: Mucous membranes are moist.      Pharynx: Oropharynx is clear.   Eyes:      Extraocular Movements: Extraocular movements intact.      Conjunctiva/sclera: Conjunctivae normal.      Pupils: Pupils are equal, round, and reactive to light.   Neck:      Thyroid: No thyromegaly or thyroid tenderness.   Cardiovascular:      Rate and Rhythm: Normal rate and regular rhythm.      Pulses: Normal pulses.      Heart sounds: Normal heart sounds.   Pulmonary:      Effort: Pulmonary effort is normal.      Breath sounds: Normal breath sounds. No stridor. No wheezing or rales.   Abdominal:      General: Bowel sounds are normal. There is no distension.      Palpations: Abdomen is soft.      Tenderness: There is no abdominal tenderness.   Musculoskeletal:         General: No tenderness. Normal range of motion.      Cervical back: Normal range of motion and neck supple.      Right lower leg: No edema.      Left lower leg: No edema.   Lymphadenopathy:      Cervical: No cervical adenopathy.   Skin:     General: Skin is warm and dry.      Capillary Refill: Capillary refill takes less than 2 seconds.   Neurological:      General: No focal deficit present.      Mental Status: She is alert and oriented to person, place, and time.   Psychiatric:         Mood and Affect: Mood and affect normal.         Behavior: Behavior normal. Behavior is cooperative.         Thought Content: Thought content normal.         Judgment: Judgment normal.         Office Visit on 01/09/2024   Component Date Value Ref Range Status    Disclaimer 01/09/2024    Final                    Value:DISCLAIMER: Annual Pap smears are the best means now available for the early detection of cervical cancer.  There is no guarantee, however, that a particular Pap smear will  diseased cells that are present.  Thus, false negative reports may   occasionally occur.      Source 01/09/2024 THIN PREP   Final    Chlamydia 01/09/2024 POSITIVE (A)  NEGATIVE  Final    Gonorrhea 01/09/2024 NEGATIVE  NEGATIVE Final    Source 01/09/2024 THIN PREP   Final    TRICHOMONAS AMPLIFIED 01/09/2024 POSITIVE (A)  NEGATIVE Final         Assessment:      1. Annual physical exam    2. Possible exposure to STD    3. Urinary frequency    4. Vaginal discharge    5. Foul smelling urine    6. Thyroid disorder screen    7. Vitamin D deficiency    8. Screening cholesterol level    9. Diabetes mellitus screening    10. Screening for HIV (human immunodeficiency virus)    11. Class 1 obesity due to excess calories without serious comorbidity with body mass index (BMI) of 32.0 to 32.9 in adult          Plan:     Annual physical exam  Comments:  Will review labs and determine POC based on results.  Orders:  -     CBC Auto Differential; Future; Expected date: 05/23/2024  -     Comprehensive Metabolic Panel; Future; Expected date: 05/23/2024  -     Hemoglobin A1C; Future; Expected date: 05/23/2024  -     Lipid Panel; Future; Expected date: 05/23/2024  -     TSH; Future; Expected date: 05/23/2024    Possible exposure to STD  -     C. trachomatis/N. gonorrhoeae by AMP DNA; Future; Expected date: 05/23/2024  -     Trichomonas vaginalis, RNA, Qual, Urine; Future; Expected date: 05/23/2024  -     HIV 1/2 Ag/Ab (4th Gen); Future; Expected date: 05/23/2024    Urinary frequency  -     Urinalysis, Reflex to Urine Culture Urine, Clean Catch; Future; Expected date: 05/23/2024    Vaginal discharge  -     C. trachomatis/N. gonorrhoeae by AMP DNA; Future; Expected date: 05/23/2024  -     Trichomonas vaginalis, RNA, Qual, Urine; Future; Expected date: 05/23/2024    Foul smelling urine  -     Urinalysis, Reflex to Urine Culture Urine, Clean Catch; Future; Expected date: 05/23/2024    Thyroid disorder screen  -     T4, Free; Future; Expected date: 05/23/2024  -     TSH; Future; Expected date: 05/23/2024    Vitamin D deficiency  -     Vitamin D; Future; Expected date: 05/23/2024    Screening cholesterol level  -      Lipid Panel; Future; Expected date: 05/23/2024    Diabetes mellitus screening  -     Hemoglobin A1C; Future; Expected date: 05/23/2024    Screening for HIV (human immunodeficiency virus)  -     HIV 1/2 Ag/Ab (4th Gen); Future; Expected date: 05/23/2024    Class 1 obesity due to excess calories without serious comorbidity with body mass index (BMI) of 32.0 to 32.9 in adult  Comments:  Eat a low carb, low sugar diet and exercise regularly.         No current outpatient medications on file.     Current Facility-Administered Medications   Medication Dose Route Frequency Provider Last Rate Last Admin    etonogestreL subdermal device 68 mg  68 mg Implant  En Sparks MD   68 mg at 01/09/23 1300       Medications Discontinued During This Encounter   Medication Reason    estradioL (ESTRACE) 2 MG tablet        Health Maintenance   Topic Date Due    Chlamydia Screening  01/09/2025    Pap Smear  01/09/2027    TETANUS VACCINE  09/08/2032    Hepatitis C Screening  Completed    Lipid Panel  Completed    HPV Vaccines  Completed       Patient Instructions   RTC in 6 months for F/U or sooner if needed.      Patient Education       Yearly Physical for Adults   About this topic   Most people do not want to be sick. Having a checkup each year with your doctor is one way to help you stay healthy. You may need to see your doctor more or less often. How often you need to go to the doctor depends on your age. Your family and medical history also play a role in how often you need to go to the doctor. Going to see your doctor on a routine basis can help you find problems early or even before they start. This may make it easier to treat or cure your problem.  General   Your doctor will talk about many things during your checkup. Your doctor may ask about:  Your medical and family history.  All the drugs you are taking. Be sure to include all prescription, over the counter, and herbal supplements. Tell the doctor if you have any drug  allergy. Bring a list of drugs you take with you.  How you are feeling and if you are having any problems.  Risky behaviors like smoking, drinking alcohol, using illegal drugs, not wearing seatbelts, having unprotected sex, etc.  Your doctor will do a physical exam and may check your:  Height and weight  Blood pressure  Reflexes  Memory  Vision  Hearing  Your doctor may order:  Lab tests  ECG to check your heart rhythm  X-rays  Tests or treatments based on your exam  What lifestyle changes are needed?   Your doctor may suggest you make changes to your lifestyle at this visit. The doctor may talk with you about being more active or lowering stress levels. Ask your doctor what you need to do.  What drugs may be needed?   Your doctor may order drugs or vaccines to protect you from illnesses.  What changes to diet are needed?   Talk to your doctor to see if any changes are needed to your diet.  When do I need to call the doctor?   Call your doctor if you need to learn about any test results. Together you can make a plan for more care.  Helpful tips   Make a list of questions for your doctor before you go. This will help you remember to ask about any concerns. Write down any answers from your doctor so you can look over them after your visit.   Tell your doctor about any changes in your body or health since your last visit.  Ask your doctor about any screening tests you need.  Where can I learn more?   American Academy [employer]icians  http://familydoctor.org/familydoctor/en/prevention-wellness/staying-healthy/healthy-living/preventive-services-for-healthy-living.printerview.html   Centers for Disease Control  http://www.cdc.gov/family/checkup/   Last Reviewed Date   2019-04-22  Consumer Information Use and Disclaimer   This information is not specific medical advice and does not replace information you receive from your health care provider. This is only a brief summary of general information. It does NOT include  all information about conditions, illnesses, injuries, tests, procedures, treatments, therapies, discharge instructions or life-style choices that may apply to you. You must talk with your health care provider for complete information about your health and treatment options. This information should not be used to decide whether or not to accept your health care providers advice, instructions or recommendations. Only your health care provider has the knowledge and training to provide advice that is right for you.  Copyright   Copyright © 2021 [employer] and its affiliates and/or licensors. All rights reserved.    Patient Education       STD Prevention   About this topic   Sexually-transmitted diseases or STDs are infections you catch during sexual contact. This includes vaginal, oral, and anal sex. You may also get it by coming in contact with skin, genitals, mouth, rectum, or body fluids of an infected person. A person with an STD may not have any signs or know they have it. STDs can be very serious and have long-term health effects. STDs can cause cancer, blindness, or not being able to have children.  Bacteria, viruses, or parasites can cause STDs. Bacterial STDs are treated with antibiotics. Only the signs of viral STDs are treated. Common STDs include:  Chlamydia   Gonorrhea  Syphilis  Human immunodeficiency virus (HIV)  Herpes simplex  Human papillomavirus (HPV)  Hepatitis B and C  Trichomonas  STDs may cause signs like:  Pain when passing urine  Sores or genital warts  Unusual discharge from penis or vagina  Itching on your inner thighs, anus, or genitals  Abnormal menstrual bleeding  Pain or bleeding during sex  Swelling of testicles  Fever  Muscle or joint pain  These signs may come and go. It is important to see your doctor even if you think the signs are gone.  General   Learn about your health, your body, sex, love, and relationships. These are all important parts of sexual health.  Learn about STDs.  Find reliable information about STDs.  Know the right names for both male and female body parts.  Find information about the kinds of infections you could get.  Know how STDs spread as well as the signs and treatment.     What will the results be?   You may be able to protect yourself from getting STDs.  You may be able to prevent long-term effects on your health.  If you are pregnant, you may be able to prevent giving your unborn baby a STD.  Will there be any other care needed?   Ask your doctor if there are shots or pills you can take to prevent a STD.  Know your STD status. Get tested and have your partner tested.  What can be done to prevent this health problem?   The only sure way to keep from getting or passing on a sexually-transmitted infection is to not have sexual contact with anyone.  Even if you do not have any signs of illness, you may spread an STD.  Having an STD can increase your chances of catching HIV, the virus that causes AIDS.  If you have any type of sexual contact, use latex condoms each time to reduce the spread of infection. Use a condom with each partner every time, from the start of sex to the end.  Avoid contact with any sex partner known to have an infection or who has signs of an infection like genital sores or warts.  Avoid multiple sex partners. A long-term monogamous relationship with a partner who has tested negative and is known to have no infection is the safest partner.  If you are pregnant, get tested and get prompt treatment for an STD. This will help avoid passing it to your baby.  Avoid using drugs or too much alcohol. Either of these can lead to risky behavior like unprotected sex.  Talk to your partner about STDs before you have sex. Talk about having safe sex and if your relationship is monogamous.  Wash your hands and genitals with soap and water after sex.  See your doctor for regular exams and check-ups for STDs.  Talk to your doctor about available vaccines for  prevention of certain STDs.  Where can I learn more?   American Academy [employer]icians  http://familydoctor.org/familydoctor/en/diseases-conditions/sexually-transmitted-infections/prevention.printerview.all.html   Centers for Disease Control  http://www.cdc.gov/std/prevention/default.htm   Last Reviewed Date   2021-03-31  Consumer Information Use and Disclaimer   This information is not specific medical advice and does not replace information you receive from your health care provider. This is only a brief summary of general information. It does NOT include all information about conditions, illnesses, injuries, tests, procedures, treatments, therapies, discharge instructions or life-style choices that may apply to you. You must talk with your health care provider for complete information about your health and treatment options. This information should not be used to decide whether or not to accept your health care providers advice, instructions or recommendations. Only your health care provider has the knowledge and training to provide advice that is right for you.  Copyright   Copyright © 2021 [employer] and its affiliates and/or licensors. All rights reserved.      Risks, benefits, and alternatives discussed with patient, Patient verbalized understanding of discussed plan of care. Asked patient if any further questions, answered no.    Future Appointments   Date Time Provider Department Center   11/20/2024  2:00 PM Yane Leiva NP [facility] [id number] [facility]   1/14/2025  1:45 PM En Sparks MD [facility] [id number] [facility] Leiva NP

## 2024-05-23 NOTE — PATIENT INSTRUCTIONS
RTC in 6 months for F/U or sooner if needed.      Patient Education       Yearly Physical for Adults   About this topic   Most people do not want to be sick. Having a checkup each year with your doctor is one way to help you stay healthy. You may need to see your doctor more or less often. How often you need to go to the doctor depends on your age. Your family and medical history also play a role in how often you need to go to the doctor. Going to see your doctor on a routine basis can help you find problems early or even before they start. This may make it easier to treat or cure your problem.  General   Your doctor will talk about many things during your checkup. Your doctor may ask about:  Your medical and family history.  All the drugs you are taking. Be sure to include all prescription, over the counter, and herbal supplements. Tell the doctor if you have any drug allergy. Bring a list of drugs you take with you.  How you are feeling and if you are having any problems.  Risky behaviors like smoking, drinking alcohol, using illegal drugs, not wearing seatbelts, having unprotected sex, etc.  Your doctor will do a physical exam and may check your:  Height and weight  Blood pressure  Reflexes  Memory  Vision  Hearing  Your doctor may order:  Lab tests  ECG to check your heart rhythm  X-rays  Tests or treatments based on your exam  What lifestyle changes are needed?   Your doctor may suggest you make changes to your lifestyle at this visit. The doctor may talk with you about being more active or lowering stress levels. Ask your doctor what you need to do.  What drugs may be needed?   Your doctor may order drugs or vaccines to protect you from illnesses.  What changes to diet are needed?   Talk to your doctor to see if any changes are needed to your diet.  When do I need to call the doctor?   Call your doctor if you need to learn about any test results. Together you can make a plan for more care.  Helpful tips   Make  a list of questions for your doctor before you go. This will help you remember to ask about any concerns. Write down any answers from your doctor so you can look over them after your visit.   Tell your doctor about any changes in your body or health since your last visit.  Ask your doctor about any screening tests you need.  Where can I learn more?   [employer]  http://familydoctor.org/familydoctor/en/prevention-wellness/staying-healthy/healthy-living/preventive-services-for-healthy-living.printerview.html   Centers for Disease Control  http://www.cdc.gov/family/checkup/   Last Reviewed Date   2019-04-22  Consumer Information Use and Disclaimer   This information is not specific medical advice and does not replace information you receive from your health care provider. This is only a brief summary of general information. It does NOT include all information about conditions, illnesses, injuries, tests, procedures, treatments, therapies, discharge instructions or life-style choices that may apply to you. You must talk with your health care provider for complete information about your health and treatment options. This information should not be used to decide whether or not to accept your health care providers advice, instructions or recommendations. Only your health care provider has the knowledge and training to provide advice that is right for you.  Copyright   Copyright © 2021 [employer] and its affiliates and/or licensors. All rights reserved.    Patient Education       STD Prevention   About this topic   Sexually-transmitted diseases or STDs are infections you catch during sexual contact. This includes vaginal, oral, and anal sex. You may also get it by coming in contact with skin, genitals, mouth, rectum, or body fluids of an infected person. A person with an STD may not have any signs or know they have it. STDs can be very serious and have long-term health effects. STDs can cause  cancer, blindness, or not being able to have children.  Bacteria, viruses, or parasites can cause STDs. Bacterial STDs are treated with antibiotics. Only the signs of viral STDs are treated. Common STDs include:  Chlamydia   Gonorrhea  Syphilis  Human immunodeficiency virus (HIV)  Herpes simplex  Human papillomavirus (HPV)  Hepatitis B and C  Trichomonas  STDs may cause signs like:  Pain when passing urine  Sores or genital warts  Unusual discharge from penis or vagina  Itching on your inner thighs, anus, or genitals  Abnormal menstrual bleeding  Pain or bleeding during sex  Swelling of testicles  Fever  Muscle or joint pain  These signs may come and go. It is important to see your doctor even if you think the signs are gone.  General   Learn about your health, your body, sex, love, and relationships. These are all important parts of sexual health.  Learn about STDs. Find reliable information about STDs.  Know the right names for both male and female body parts.  Find information about the kinds of infections you could get.  Know how STDs spread as well as the signs and treatment.     What will the results be?   You may be able to protect yourself from getting STDs.  You may be able to prevent long-term effects on your health.  If you are pregnant, you may be able to prevent giving your unborn baby a STD.  Will there be any other care needed?   Ask your doctor if there are shots or pills you can take to prevent a STD.  Know your STD status. Get tested and have your partner tested.  What can be done to prevent this health problem?   The only sure way to keep from getting or passing on a sexually-transmitted infection is to not have sexual contact with anyone.  Even if you do not have any signs of illness, you may spread an STD.  Having an STD can increase your chances of catching HIV, the virus that causes AIDS.  If you have any type of sexual contact, use latex condoms each time to reduce the spread of infection.  Use a condom with each partner every time, from the start of sex to the end.  Avoid contact with any sex partner known to have an infection or who has signs of an infection like genital sores or warts.  Avoid multiple sex partners. A long-term monogamous relationship with a partner who has tested negative and is known to have no infection is the safest partner.  If you are pregnant, get tested and get prompt treatment for an STD. This will help avoid passing it to your baby.  Avoid using drugs or too much alcohol. Either of these can lead to risky behavior like unprotected sex.  Talk to your partner about STDs before you have sex. Talk about having safe sex and if your relationship is monogamous.  Wash your hands and genitals with soap and water after sex.  See your doctor for regular exams and check-ups for STDs.  Talk to your doctor about available vaccines for prevention of certain STDs.  Where can I learn more?   American [employer]  http://familydoctor.org/familydoctor/en/diseases-conditions/sexually-transmitted-infections/prevention.printerview.all.html   Centers for Disease Control  http://www.cdc.gov/std/prevention/default.htm   Last Reviewed Date   2021-03-31  Consumer Information Use and Disclaimer   This information is not specific medical advice and does not replace information you receive from your health care provider. This is only a brief summary of general information. It does NOT include all information about conditions, illnesses, injuries, tests, procedures, treatments, therapies, discharge instructions or life-style choices that may apply to you. You must talk with your health care provider for complete information about your health and treatment options. This information should not be used to decide whether or not to accept your health care providers advice, instructions or recommendations. Only your health care provider has the knowledge and training to provide advice that is  right for you.  Copyright   Copyright © 2021 [employer] and its affiliates and/or licensors. All rights reserved.

## 2024-05-25 LAB
APTIMA MEDIA TYPE: NORMAL
C. TRACHOMATIS DNA PROBE RESULT:: NEGATIVE
N. GONORRHOEAE DNA PROBE RESULT:: NEGATIVE
ORGANISM: NORMAL
SPECIMEN SOURCE: NORMAL
URINE CULTURE, ROUTINE: NORMAL

## 2024-05-27 ENCOUNTER — PATIENT MESSAGE (OUTPATIENT)
Dept: PRIMARY CARE CLINIC | Facility: CLINIC | Age: 24
End: 2024-05-27
Payer: MEDICAID

## 2024-05-27 DIAGNOSIS — E55.9 VITAMIN D DEFICIENCY: Primary | ICD-10-CM

## 2024-05-27 DIAGNOSIS — N30.00 ACUTE CYSTITIS WITHOUT HEMATURIA: ICD-10-CM

## 2024-05-27 RX ORDER — NITROFURANTOIN 25; 75 MG/1; MG/1
100 CAPSULE ORAL 2 TIMES DAILY
Qty: 10 CAPSULE | Refills: 0 | Status: SHIPPED | OUTPATIENT
Start: 2024-05-27 | End: 2024-06-01

## 2024-05-27 RX ORDER — ASPIRIN 325 MG
50000 TABLET, DELAYED RELEASE (ENTERIC COATED) ORAL WEEKLY
Qty: 4 CAPSULE | Refills: 2 | Status: SHIPPED | OUTPATIENT
Start: 2024-05-27 | End: 2024-08-13

## 2024-05-27 NOTE — PROGRESS NOTES
Please notify patient I have reviewed her labs and her urine shows she has a UTI so I have ordered an antibiotic to her pharmacy for her to take twice daily for 5 days. Also, her Vitamin D level is very low so I have ordered high dose Vitamin D to her pharmacy to take once weekly for 12 weeks. All of her other blood work is within normal     Also, please let patient know she is negative for chlamydia and gonorrhea but I am still waiting on the Trichomonas result so I will let her know the result of it as soon as I receive it. Thanks.

## 2024-11-20 ENCOUNTER — CLINICAL SUPPORT (OUTPATIENT)
Dept: OBSTETRICS AND GYNECOLOGY | Facility: CLINIC | Age: 24
End: 2024-11-20
Payer: MEDICAID

## 2024-11-20 ENCOUNTER — OFFICE VISIT (OUTPATIENT)
Dept: PRIMARY CARE CLINIC | Facility: CLINIC | Age: 24
End: 2024-11-20
Payer: MEDICAID

## 2024-11-20 VITALS
WEIGHT: 194.63 LBS | SYSTOLIC BLOOD PRESSURE: 131 MMHG | DIASTOLIC BLOOD PRESSURE: 84 MMHG | HEIGHT: 65 IN | TEMPERATURE: 99 F | OXYGEN SATURATION: 98 % | RESPIRATION RATE: 18 BRPM | BODY MASS INDEX: 32.43 KG/M2 | HEART RATE: 82 BPM

## 2024-11-20 DIAGNOSIS — R82.90 FOUL SMELLING URINE: ICD-10-CM

## 2024-11-20 DIAGNOSIS — E55.9 VITAMIN D DEFICIENCY: ICD-10-CM

## 2024-11-20 DIAGNOSIS — N89.8 VAGINAL DISCHARGE: ICD-10-CM

## 2024-11-20 DIAGNOSIS — L02.92 RECURRENT BOILS: Primary | ICD-10-CM

## 2024-11-20 DIAGNOSIS — R35.0 URINARY FREQUENCY: ICD-10-CM

## 2024-11-20 DIAGNOSIS — E66.811 CLASS 1 OBESITY DUE TO EXCESS CALORIES WITHOUT SERIOUS COMORBIDITY WITH BODY MASS INDEX (BMI) OF 32.0 TO 32.9 IN ADULT: ICD-10-CM

## 2024-11-20 DIAGNOSIS — Z32.00 POSSIBLE PREGNANCY: ICD-10-CM

## 2024-11-20 DIAGNOSIS — R79.9 ABNORMAL BLOOD CELL COUNT: ICD-10-CM

## 2024-11-20 DIAGNOSIS — R10.30 LOWER ABDOMINAL PAIN: ICD-10-CM

## 2024-11-20 DIAGNOSIS — E66.09 CLASS 1 OBESITY DUE TO EXCESS CALORIES WITHOUT SERIOUS COMORBIDITY WITH BODY MASS INDEX (BMI) OF 32.0 TO 32.9 IN ADULT: ICD-10-CM

## 2024-11-20 DIAGNOSIS — Z01.89 ROUTINE LAB DRAW: Primary | ICD-10-CM

## 2024-11-20 DIAGNOSIS — Z20.2 POSSIBLE EXPOSURE TO STD: ICD-10-CM

## 2024-11-20 DIAGNOSIS — R53.83 OTHER FATIGUE: ICD-10-CM

## 2024-11-20 LAB
ALBUMIN SERPL BCP-MCNC: 4.3 G/DL (ref 3.4–5)
ALBUMIN/GLOBULIN RATIO: 1.1 RATIO (ref 1.1–1.8)
ALP SERPL-CCNC: 78 U/L (ref 45–117)
ALT SERPL W P-5'-P-CCNC: 14 U/L (ref 13–56)
ANION GAP SERPL CALC-SCNC: 3 MMOL/L (ref 3–11)
APPEARANCE, UA: ABNORMAL
AST SERPL-CCNC: 12 U/L (ref 15–37)
B-HCG SERPL-ACNC: < 1 MIU/ML
BASOPHILS NFR BLD: 0.2 % (ref 0–3)
BILIRUB SERPL-MCNC: 0.5 MG/DL (ref 0.2–1)
BILIRUB UR QL STRIP: NEGATIVE MG/DL
BUN SERPL-MCNC: 10 MG/DL (ref 7–18)
BUN/CREAT SERPL: 14.08 RATIO
CALCIUM SERPL-MCNC: 9.4 MG/DL (ref 8.5–10.1)
CHLORIDE SERPL-SCNC: 105 MMOL/L (ref 98–107)
CO2 SERPL-SCNC: 31 MMOL/L (ref 21–32)
COLOR UR: YELLOW
CREAT SERPL-MCNC: 0.71 MG/DL (ref 0.55–1.02)
EOSINOPHIL NFR BLD: 0.6 % (ref 0–6)
ERYTHROCYTE [DISTWIDTH] IN BLOOD BY AUTOMATED COUNT: 11.9 % (ref 0–15.5)
GFR ESTIMATION: 123
GLOBULIN: 3.8 G/DL (ref 2.3–3.5)
GLUCOSE (UA): NEGATIVE MG/DL
GLUCOSE SERPL-MCNC: 88 MG/DL (ref 74–106)
HCT VFR BLD AUTO: 41.6 % (ref 37–47)
HGB BLD-MCNC: 13.6 G/DL (ref 12–16)
HGB UR QL STRIP: NEGATIVE /UL
KETONES UR QL STRIP: NEGATIVE MG/DL
LEUKOCYTE ESTERASE UR QL STRIP: 25 /UL
LYMPHOCYTES NFR BLD: 19.2 % (ref 20–45)
MCH RBC QN AUTO: 30 PG (ref 27–32)
MCHC RBC AUTO-ENTMCNC: 32.7 % (ref 32–36)
MCV RBC AUTO: 91.8 FL (ref 80–99)
MONOCYTES NFR BLD: 5.1 % (ref 2–10)
NEUTROPHILS # BLD AUTO: 7.1 10*3/UL (ref 1.4–7)
NEUTROPHILS NFR BLD: 74.6 % (ref 50–80)
NITRITE UR QL STRIP: NEGATIVE
NUCLEATED RED BLOOD CELLS: 0 % (ref 0–0.2)
PH UR STRIP: 6 PH (ref 5–8)
PLATELETS: 289 10*3/UL (ref 130–400)
POTASSIUM SERPL-SCNC: 3.9 MMOL/L (ref 3.5–5.1)
PROT SERPL-MCNC: 8.1 G/DL (ref 6.4–8.2)
PROT UR QL STRIP: NEGATIVE MG/DL
RBC # BLD AUTO: 4.53 10*6/UL (ref 4.2–5.4)
SODIUM BLD-SCNC: 139 MMOL/L (ref 131–143)
SP GR UR STRIP: 1.02 (ref 1–1.03)
UROBILINOGEN UR STRIP-ACNC: 2 MG/DL
WBC # BLD: 9.5 10*3/UL (ref 4.5–10)

## 2024-11-20 PROCEDURE — 1159F MED LIST DOCD IN RCRD: CPT | Mod: CPTII,,, | Performed by: NURSE PRACTITIONER

## 2024-11-20 PROCEDURE — 99214 OFFICE O/P EST MOD 30 MIN: CPT | Mod: S$PBB,,, | Performed by: NURSE PRACTITIONER

## 2024-11-20 PROCEDURE — 3044F HG A1C LEVEL LT 7.0%: CPT | Mod: CPTII,,, | Performed by: NURSE PRACTITIONER

## 2024-11-20 PROCEDURE — 3079F DIAST BP 80-89 MM HG: CPT | Mod: CPTII,,, | Performed by: NURSE PRACTITIONER

## 2024-11-20 PROCEDURE — 1160F RVW MEDS BY RX/DR IN RCRD: CPT | Mod: CPTII,,, | Performed by: NURSE PRACTITIONER

## 2024-11-20 PROCEDURE — 3008F BODY MASS INDEX DOCD: CPT | Mod: CPTII,,, | Performed by: NURSE PRACTITIONER

## 2024-11-20 PROCEDURE — 3075F SYST BP GE 130 - 139MM HG: CPT | Mod: CPTII,,, | Performed by: NURSE PRACTITIONER

## 2024-11-20 RX ORDER — DOXYCYCLINE HYCLATE 100 MG
100 TABLET ORAL 2 TIMES DAILY
Qty: 14 TABLET | Refills: 0 | Status: SHIPPED | OUTPATIENT
Start: 2024-11-20 | End: 2024-11-27

## 2024-11-20 NOTE — PROGRESS NOTES
Subjective:       Patient ID: Luciana Jacob is a 24 y.o. female.    Chief Complaint: Follow-up, Abdominal Pain, Recurrent Skin Infections, and Headache    25 y/o female in for F/U.    C/O fatigued and with headaches two to three days per week and they usually occur at the left side of her head. She also has low abdominal, pelvic pain which she says has been constant now. Also with foul smelling urine and vaginal discharge. She tested positive for both chlamydia and trichomonas in January and received treatment of flagyl and doxycycline at that time and she says she completed both medications. She has also been experiencing recurrent boils to her armpits which she believes may be caused by her nexplanon that she has for birth control.    On her last labs, her urine showed she had a UTI and was treated with antibiotics. Also, her Vitamin D level was very low so she was treated with high dose Vitamin D weekly for 12 weeks. All of her other blood work was within normal range.    Ms. Lerma is followed by Dr. Sparks for OB/GYN. PAP UTD. She has an IUD for birth control.    Denies smoking or drinking.                    History reviewed. No pertinent past medical history.    Past Surgical History:   Procedure Laterality Date    KNEE SURGERY      KNEE SURGERY Left 2017       Family History   Problem Relation Name Age of Onset    Pancreatic cancer Paternal Grandmother      Hypertension Father         Social History     Tobacco Use    Smoking status: Never    Smokeless tobacco: Never   Substance Use Topics    Alcohol use: Not Currently    Drug use: Never       Patient Active Problem List   Diagnosis    Gastroesophageal reflux disease with esophagitis without hemorrhage       Immunization History   Administered Date(s) Administered    COVID-19, MRNA, LN-S, PF (Pfizer) (Purple Cap) 08/03/2021, 08/24/2021    DTaP 2002, 01/08/2003, 03/26/2003, 07/07/2004    DTaP / Hep B / IPV 11/15/2006    HPV 9-Valent 02/02/2017,  11/06/2018    Hep B / HiB 2002, 03/26/2003    Hepatitis A, Pediatric/Adolescent, 2 Dose 11/15/2006, 02/02/2017    Hepatitis B, Pediatric/Adolescent 2002    HiB PRP-OMP 11/15/2006    Hib-HbOC 01/08/2003, 07/07/2004    IPV 2002, 01/08/2003, 03/26/2003    Influenza 09/16/2022    Influenza - Intranasal 11/29/2010, 01/31/2013    Influenza - Quadrivalent - PF *Preferred* (6 months and older) 12/01/2004, 02/25/2008    MMR 01/07/2004    MMRV 11/15/2006    Meningococcal B, recombinant 11/05/2020    Meningococcal Conjugate (MCV4P) 10/10/2013, 11/06/2018    Pneumococcal Conjugate - 7 Valent 2002, 01/08/2003, 06/11/2003, 12/01/2004, 11/15/2006    Tdap 10/10/2013, 09/08/2022    Varicella 01/07/2004           Review of Systems   Constitutional:  Negative for activity change, appetite change, chills, diaphoresis, fatigue and fever.   HENT:  Negative for tinnitus and trouble swallowing.    Eyes:  Negative for visual disturbance.   Respiratory:  Negative for cough, chest tightness, shortness of breath and wheezing.    Cardiovascular:  Negative for chest pain, palpitations and leg swelling.   Gastrointestinal:  Positive for abdominal pain. Negative for abdominal distention, blood in stool, constipation, diarrhea, nausea and vomiting.   Endocrine: Negative for cold intolerance, heat intolerance, polydipsia, polyphagia and polyuria.   Genitourinary:  Positive for pelvic pain and vaginal discharge. Negative for decreased urine volume, dysuria, frequency, hematuria, urgency, vaginal bleeding and vaginal pain.   Musculoskeletal:  Negative for gait problem and neck pain.   Skin:  Negative for color change and rash.   Neurological:  Positive for headaches. Negative for dizziness, syncope, weakness, light-headedness and numbness.   Psychiatric/Behavioral:  Negative for behavioral problems, confusion and dysphoric mood. The patient is not nervous/anxious.      Objective:     Vitals:    11/20/24 1414   BP: 131/84   BP  "Location: Right arm   Patient Position: Sitting   Pulse: 82   Resp: 18   Temp: 98.5 °F (36.9 °C)   TempSrc: Oral   SpO2: 98%   Weight: 88.3 kg (194 lb 9.6 oz)   Height: 5' 5" (1.651 m)       Physical Exam  Vitals and nursing note reviewed.   Constitutional:       General: She is not in acute distress.     Appearance: Normal appearance. She is not diaphoretic.   HENT:      Head: Normocephalic and atraumatic.      Mouth/Throat:      Mouth: Mucous membranes are moist.      Pharynx: Oropharynx is clear.   Eyes:      Extraocular Movements: Extraocular movements intact.      Conjunctiva/sclera: Conjunctivae normal.   Cardiovascular:      Rate and Rhythm: Normal rate and regular rhythm.      Heart sounds: Normal heart sounds.   Pulmonary:      Effort: Pulmonary effort is normal.      Breath sounds: Normal breath sounds. No stridor. No wheezing or rales.   Abdominal:      General: There is no distension.      Tenderness: There is no abdominal tenderness.   Musculoskeletal:         General: No tenderness. Normal range of motion.      Cervical back: Normal range of motion.      Right lower leg: No edema.      Left lower leg: No edema.   Lymphadenopathy:      Cervical: No cervical adenopathy.   Skin:     General: Skin is warm and dry.      Findings: No rash.   Neurological:      Mental Status: She is alert and oriented to person, place, and time.   Psychiatric:         Mood and Affect: Mood and affect normal.         Behavior: Behavior normal. Behavior is cooperative.         Orders Only on 11/20/2024   Component Date Value Ref Range Status    Vit D, 25-Hydroxy 11/20/2024 21 (L)  >30 ng/mL Final   Orders Only on 11/20/2024   Component Date Value Ref Range Status    HCG, Quantitative 11/20/2024 < 1  mIU/mL Final   Orders Only on 11/20/2024   Component Date Value Ref Range Status    Sodium 11/20/2024 139  131 - 143 mmol/L Final    Potassium 11/20/2024 3.9  3.5 - 5.1 mmol/L Final    Chloride 11/20/2024 105  98 - 107 mmol/L Final "    CO2 11/20/2024 31  21 - 32 mmol/L Final    Anion Gap 11/20/2024 3.0  3.0 - 11.0 mmol/L Final    BUN 11/20/2024 10.0  7.0 - 18.0 mg/dL Final    Creatinine 11/20/2024 0.71  0.55 - 1.02 mg/dL Final    GFR ESTIMATION 11/20/2024 123  >60 Final    BUN/Creatinine Ratio 11/20/2024 14.08  Ratio Final    Glucose 11/20/2024 88  74 - 106 mg/dL Final    Calcium 11/20/2024 9.4  8.5 - 10.1 mg/dL Final    Total Bilirubin 11/20/2024 0.5  0.2 - 1.0 mg/dL Final    AST 11/20/2024 12 (L)  15 - 37 U/L Final    ALT 11/20/2024 14  13 - 56 U/L Final    Total Protein 11/20/2024 8.1  6.4 - 8.2 g/dL Final    Albumin 11/20/2024 4.3  3.4 - 5.0 g/dL Final    Globulin 11/20/2024 3.8 (H)  2.3 - 3.5 g/dL Final    Albumin/Globulin Ratio 11/20/2024 1.1  1.1 - 1.8 Ratio Final    Alkaline Phosphatase 11/20/2024 78  45 - 117 U/L Final   Orders Only on 11/20/2024   Component Date Value Ref Range Status    WBC 11/20/2024 9.5  4.5 - 10.0 10*3/uL Final    RBC 11/20/2024 4.53  4.20 - 5.40 10*6/uL Final    Hemoglobin 11/20/2024 13.6  12.0 - 16.0 g/dL Final    Hematocrit 11/20/2024 41.6  37.0 - 47.0 % Final    MCV 11/20/2024 91.8  80.0 - 99.0 fL Final    MCH 11/20/2024 30.0  27.0 - 32.0 pg Final    MCHC 11/20/2024 32.7  32.0 - 36.0 % Final    RDW RBC Auto-Rto 11/20/2024 11.9  0.0 - 15.5 % Final    Platelets 11/20/2024 289  130 - 400 10*3/uL Final    Neutrophils 11/20/2024 74.6  50 - 80 % Final    Lymphocytes 11/20/2024 19.2 (L)  20.0 - 45.0 % Final    Monocytes 11/20/2024 5.1  2 - 10 % Final    Eosinophils 11/20/2024 0.6  0 - 6 % Final    Basophils 11/20/2024 0.2  0 - 3 % Final    nRBC# 11/20/2024 0.0  0 - 0.2 % Final    Neutrophils Absolute 11/20/2024 7.1 (H)  1.4 - 7.0 10*3/uL Final   Orders Only on 11/20/2024   Component Date Value Ref Range Status    Color, UA 11/20/2024 Yellow  Yellow Final    Appearance,  11/20/2024 Turbid (A)  Clear Final    pH,  11/20/2024 6.0  5.0 - 8.0 pH Final    Specific [place],  11/20/2024 1.023  1.005 - 1.030 Final     Protein, UA 11/20/2024 Negative  Negative mg/dL Final    Glucose, UA 11/20/2024 Negative  Normal mg/dL Final    Ketones, UA 11/20/2024 Negative  Negative mg/dL Final    Occult Blood UA 11/20/2024 Negative  Negative /uL Final    Nitrite, UA 11/20/2024 Negative  Negative Final    Bilirubin (UA) 11/20/2024 Negative  Negative mg/dL Final    Urobilinogen, UA 11/20/2024 2.0  Normal mg/dL Final    Leukocytes, UA 11/20/2024 25 (A)  Negative /uL Final   Orders Only on 05/23/2024   Component Date Value Ref Range Status    Aptima Media Type 05/23/2024 URINE   Final    Specimen Source 05/23/2024 URINE   Final    C. Trachomatis DNA Probe Result: 05/23/2024 NEGATIVE  Negative Final    N. Gonorrohoae DNA Probe Result:PA* 05/23/2024 NEGATIVE  Negative Final   Orders Only on 05/23/2024   Component Date Value Ref Range Status    ORGANISM 05/23/2024 Escherichia coli   Final    Urine Culture, Routine 05/23/2024 Sensitivity status?Sensitivity completed.[place] count:>=100,000 CFU/mL   Final   Orders Only on 05/23/2024   Component Date Value Ref Range Status    Vit D, 25-Hydroxy 05/23/2024 16 (L)  >30 ng/mL Final   Orders Only on 05/23/2024   Component Date Value Ref Range Status    Sodium 05/23/2024 139  131 - 143 mmol/L Final    Potassium 05/23/2024 4.1  3.5 - 5.1 mmol/L Final    Chloride 05/23/2024 107  98 - 107 mmol/L Final    CO2 05/23/2024 29  21 - 32 mmol/L Final    Anion Gap 05/23/2024 3.0  3.0 - 11.0 mmol/L Final    BUN 05/23/2024 8.0  7.0 - 18.0 mg/dL Final    Creatinine 05/23/2024 0.75  0.55 - 1.02 mg/dL Final    GFR ESTIMATION 05/23/2024 > 60  >60 Final    BUN/Creatinine Ratio 05/23/2024 10.66  Ratio Final    Glucose 05/23/2024 62 (L)  74 - 106 mg/dL Final    Calcium 05/23/2024 9.1  8.5 - 10.1 mg/dL Final    Total Bilirubin 05/23/2024 0.4  0.2 - 1.0 mg/dL Final    AST 05/23/2024 20  15 - 37 U/L Final    ALT 05/23/2024 26  13 - 56 U/L Final    Total Protein 05/23/2024 7.5  6.4 - 8.2 g/dL Final    Albumin 05/23/2024 3.7  3.4 -  5.0 g/dL Final    Alkaline Phosphatase 05/23/2024 64  45 - 117 U/L Final    Cholesterol 05/23/2024 175  <200 mg/dL Final    Triglycerides 05/23/2024 52  0 - 149 mg/dL Final    HDL 05/23/2024 48  >39 mg/dL Final    LDL Cholesterol 05/23/2024 116.6  mg/dL Final    VLDL 05/23/2024 10  mg/dL Final    Free T4 05/23/2024 0.92  0.76 - 1.46 ng/dL Final    TSH 05/23/2024 1.170  0.358 - 3.74 uIU/mL Final   Orders Only on 05/23/2024   Component Date Value Ref Range Status    HIV-1/HIV-2 Ab 05/23/2024 Negative  Negative Final   There may be more visits with results that are not included.         Assessment:      1. Recurrent boils    2. Class 1 obesity due to excess calories without serious comorbidity with body mass index (BMI) of 32.0 to 32.9 in adult    3. Possible exposure to STD    4. Other fatigue    5. Urinary frequency    6. Vitamin D deficiency    7. Vaginal discharge    8. Foul smelling urine    9. Lower abdominal pain    10. Possible pregnancy    11. Abnormal blood cell count          Plan:     Recurrent boils  Comments:  Doxycycline ordered BID for 7 days, boil prevention education provided  Orders:  -     doxycycline (VIBRA-TABS) 100 MG tablet; Take 1 tablet (100 mg total) by mouth 2 (two) times daily. for 7 days  Dispense: 14 tablet; Refill: 0    Class 1 obesity due to excess calories without serious comorbidity with body mass index (BMI) of 32.0 to 32.9 in adult  Comments:  Eat a low carb, low sugar diet and exercise regularly    Possible exposure to STD  -     C. trachomatis/N. gonorrhoeae by AMP DNA; Future; Expected date: 11/20/2024  -     Trichomonas Vaginalis, GREER; Future; Expected date: 11/20/2024    Other fatigue  -     CBC Auto Differential; Future; Expected date: 11/20/2024  -     Comprehensive Metabolic Panel; Future; Expected date: 11/20/2024    Urinary frequency  -     Comprehensive Metabolic Panel; Future; Expected date: 11/20/2024  -     Urinalysis, Reflex to Urine Culture Urine, Clean Catch;  Future; Expected date: 11/20/2024  -     HCG, Quantitative; Future; Expected date: 11/20/2024    Vitamin D deficiency  -     Vitamin D; Future; Expected date: 11/20/2024    Vaginal discharge  -     C. trachomatis/N. gonorrhoeae by AMP DNA; Future; Expected date: 11/20/2024  -     Trichomonas Vaginalis, GREER; Future; Expected date: 11/20/2024    Foul smelling urine  -     Urinalysis, Reflex to Urine Culture Urine, Clean Catch; Future; Expected date: 11/20/2024    Lower abdominal pain  -     Comprehensive Metabolic Panel; Future; Expected date: 11/20/2024    Possible pregnancy  -     HCG, Quantitative; Future; Expected date: 11/20/2024    Abnormal blood cell count  -     CBC Auto Differential; Future; Expected date: 11/20/2024         No current outpatient medications on file.     Current Facility-Administered Medications   Medication Dose Route Frequency Provider Last Rate Last Admin    etonogestreL subdermal device 68 mg  68 mg Implant  En Sparks MD   68 mg at 01/09/23 1300       There are no discontinued medications.    Health Maintenance   Topic Date Due    Influenza Vaccine (1) 09/01/2024    COVID-19 Vaccine (3 - 2024-25 season) 09/01/2024    Chlamydia Screening  05/23/2025    Pap Smear  01/09/2027    TETANUS VACCINE  09/08/2032    RSV Vaccine (Age 60+ and Pregnant patients) (1 - 1-dose 75+ series) 09/05/2077    Hepatitis C Screening  Completed    HIV Screening  Completed    Lipid Panel  Completed    HPV Vaccines  Completed    Pneumococcal Vaccines (Age 0-64)  Aged Out       Patient Instructions   RTC in 3 months for F/U or sooner if needed.    Patient Education       Boil   The Basics   Written by the doctors and editors at [facility]   What is a boil? -- A boil is an infection under the skin that causes a painful, pus-filled lump. A boil happens when bacteria infect a hair follicle. A hair follicle is a sac under the skin where a hair starts to grow (figure 1).  Boils usually happen on the back of the  "neck, face, armpits, and buttocks. But they can happen wherever hair grows on the body.  The medical term for a boil is "furuncle." Sometimes, a few boils in the same area join together to form a big collection of pus. The medical term for a collection of boils is "carbuncle."  What are the symptoms of a boil? -- A boil is a painful, red lump under the skin. It is usually white or yellow in the center.  Boils can start out small and grow big quickly. They usually get more painful as they get bigger.  Should I try to treat my boil on my own? -- Yes. In order for your boil to heal, it needs to open so that the pus inside can drain out. Sometimes, boils open and drain on their own. But most boils need help to open and drain. After your boil drains, the pain should get much better.  To help your boil open and drain, you should put warm pressure on it. You can wet a clean washcloth with warm water and put it on your boil. When the wash cloth cools, reheat it with warm water and put it back on the boil. Repeat these steps for 10 to 15 minutes every few hours. Make sure to wash your hands after you touch your boil.  Once your boil opens and drains, it should heal on its own. But it can take a few weeks for a boil to open, drain, and heal completely.  You should not squeeze or pop your boil. Doing these things can spread the infection.  Should I see a doctor or nurse? -- See a doctor or nurse if your boil doesn't get better after you try treating it at home. You should also see the doctor or nurse if:  Your boil is very painful or gets worse.  Your boil gets better, but then comes back.  Your boil is on your face or spine.  You get a fever.  Will I need tests? -- Probably not. To make sure that you have a boil and not another skin condition, your doctor or nurse will ask about your symptoms and do an exam.  He or she might also take a small sample of pus from inside the boil. Then he or she will do lab tests on the sample " to find out what kind of bacteria is causing the boil.  What other treatment might I have? -- If your boil is very big, your doctor might do a procedure to cut it open so it can drain. If you have a collection of boils, he or she will cut open the collection of boils to drain.  Depending on your boil and other factors, you might need to take antibiotic medicines.  All topics are updated as new evidence becomes available and our peer review process is complete.  This topic retrieved from [employer] on: Sep 21, 2021.  Topic 35201 Version 7.0  Release: 29.4.2 - C29.263  © 2021 UpToDate, Inc. and/or its affiliates. All rights reserved.  figure 1: Infected hair follicle     Graphic 33073 Version 1.0    Consumer Information Use and Disclaimer   This information is not specific medical advice and does not replace information you receive from your health care provider. This is only a brief summary of general information. It does NOT include all information about conditions, illnesses, injuries, tests, procedures, treatments, therapies, discharge instructions or life-style choices that may apply to you. You must talk with your health care provider for complete information about your health and treatment options. This information should not be used to decide whether or not to accept your health care provider's advice, instructions or recommendations. Only your health care provider has the knowledge and training to provide advice that is right for you. The use of this information is governed by the [employer] End User License Agreement, available at https://www.[employer].Slidely/en/solutions/SecondMic/about/annalisa.The use of [employer] content is governed by the [employer] Terms of Use. ©2021 UpToDate, Inc. All rights reserved.  Copyright   © 2021 UpToDate, Inc. and/or its affiliates. All rights reserved.    Patient Education       STD Prevention   About this topic   Sexually-transmitted diseases or STDs are infections you catch during  sexual contact. This includes vaginal, oral, and anal sex. You may also get it by coming in contact with skin, genitals, mouth, rectum, or body fluids of an infected person. A person with an STD may not have any signs or know they have it. STDs can be very serious and have long-term health effects. STDs can cause cancer, blindness, or not being able to have children.  Bacteria, viruses, or parasites can cause STDs. Bacterial STDs are treated with antibiotics. Only the signs of viral STDs are treated. Common STDs include:  Chlamydia   Gonorrhea  Syphilis  Human immunodeficiency virus (HIV)  Herpes simplex  Human papillomavirus (HPV)  Hepatitis B and C  Trichomonas  STDs may cause signs like:  Pain when passing urine  Sores or genital warts  Unusual discharge from penis or vagina  Itching on your inner thighs, anus, or genitals  Abnormal menstrual bleeding  Pain or bleeding during sex  Swelling of testicles  Fever  Muscle or joint pain  These signs may come and go. It is important to see your doctor even if you think the signs are gone.  General   Learn about your health, your body, sex, love, and relationships. These are all important parts of sexual health.  Learn about STDs. Find reliable information about STDs.  Know the right names for both male and female body parts.  Find information about the kinds of infections you could get.  Know how STDs spread as well as the signs and treatment.     What will the results be?   You may be able to protect yourself from getting STDs.  You may be able to prevent long-term effects on your health.  If you are pregnant, you may be able to prevent giving your unborn baby a STD.  Will there be any other care needed?   Ask your doctor if there are shots or pills you can take to prevent a STD.  Know your STD status. Get tested and have your partner tested.  What can be done to prevent this health problem?   The only sure way to keep from getting or passing on a sexually-transmitted  infection is to not have sexual contact with anyone.  Even if you do not have any signs of illness, you may spread an STD.  Having an STD can increase your chances of catching HIV, the virus that causes AIDS.  If you have any type of sexual contact, use latex condoms each time to reduce the spread of infection. Use a condom with each partner every time, from the start of sex to the end.  Avoid contact with any sex partner known to have an infection or who has signs of an infection like genital sores or warts.  Avoid multiple sex partners. A long-term monogamous relationship with a partner who has tested negative and is known to have no infection is the safest partner.  If you are pregnant, get tested and get prompt treatment for an STD. This will help avoid passing it to your baby.  Avoid using drugs or too much alcohol. Either of these can lead to risky behavior like unprotected sex.  Talk to your partner about STDs before you have sex. Talk about having safe sex and if your relationship is monogamous.  Wash your hands and genitals with soap and water after sex.  See your doctor for regular exams and check-ups for STDs.  Talk to your doctor about available vaccines for prevention of certain STDs.  Where can I learn more?   American Academy of Family Physicians  http://familydoctor.org/familydoctor/en/diseases-conditions/sexually-transmitted-infections/prevention.printerview.all.html   Centers for Disease Control  http://www.cdc.gov/std/prevention/default.htm   Last Reviewed Date   2021-03-31  Consumer Information Use and Disclaimer   This information is not specific medical advice and does not replace information you receive from your health care provider. This is only a brief summary of general information. It does NOT include all information about conditions, illnesses, injuries, tests, procedures, treatments, therapies, discharge instructions or life-style choices that may apply to you. You must talk with your  health care provider for complete information about your health and treatment options. This information should not be used to decide whether or not to accept your health care providers advice, instructions or recommendations. Only your health care provider has the knowledge and training to provide advice that is right for you.  Copyright   Copyright © 2021 UpToDate, Inc. and its affiliates and/or licensors. All rights reserved.      Risks, benefits, and alternatives discussed with patient, Patient verbalized understanding of discussed plan of care. Asked patient if any further questions, answered no.    Future Appointments   Date Time Provider Department Center   1/14/2025  1:45 PM En Sparks MD [facility] [id number] [facility]   3/7/2025 11:20 AM Yane Leiva NP [facility] [id number] [facility] NP

## 2024-11-20 NOTE — PATIENT INSTRUCTIONS
"RTC in 3 months for F/U or sooner if needed.    Patient Education       Boil   The Basics   Written by the doctors and editors at [facility]   What is a boil? -- A boil is an infection under the skin that causes a painful, pus-filled lump. A boil happens when bacteria infect a hair follicle. A hair follicle is a sac under the skin where a hair starts to grow (figure 1).  Boils usually happen on the back of the neck, face, armpits, and buttocks. But they can happen wherever hair grows on the body.  The medical term for a boil is "furuncle." Sometimes, a few boils in the same area join together to form a big collection of pus. The medical term for a collection of boils is "carbuncle."  What are the symptoms of a boil? -- A boil is a painful, red lump under the skin. It is usually white or yellow in the center.  Boils can start out small and grow big quickly. They usually get more painful as they get bigger.  Should I try to treat my boil on my own? -- Yes. In order for your boil to heal, it needs to open so that the pus inside can drain out. Sometimes, boils open and drain on their own. But most boils need help to open and drain. After your boil drains, the pain should get much better.  To help your boil open and drain, you should put warm pressure on it. You can wet a clean washcloth with warm water and put it on your boil. When the wash cloth cools, reheat it with warm water and put it back on the boil. Repeat these steps for 10 to 15 minutes every few hours. Make sure to wash your hands after you touch your boil.  Once your boil opens and drains, it should heal on its own. But it can take a few weeks for a boil to open, drain, and heal completely.  You should not squeeze or pop your boil. Doing these things can spread the infection.  Should I see a doctor or nurse? -- See a doctor or nurse if your boil doesn't get better after you try treating it at home. You should also see the doctor or nurse if:  Your boil is very " painful or gets worse.  Your boil gets better, but then comes back.  Your boil is on your face or spine.  You get a fever.  Will I need tests? -- Probably not. To make sure that you have a boil and not another skin condition, your doctor or nurse will ask about your symptoms and do an exam.  He or she might also take a small sample of pus from inside the boil. Then he or she will do lab tests on the sample to find out what kind of bacteria is causing the boil.  What other treatment might I have? -- If your boil is very big, your doctor might do a procedure to cut it open so it can drain. If you have a collection of boils, he or she will cut open the collection of boils to drain.  Depending on your boil and other factors, you might need to take antibiotic medicines.  All topics are updated as new evidence becomes available and our peer review process is complete.  This topic retrieved from [employer] on: Sep 21, 2021.  Topic 70147 Version 7.0  Release: 29.4.2 - C29.263  © 2021 UpToDate, Inc. and/or its affiliates. All rights reserved.  figure 1: Infected hair follicle     Graphic 58668 Version 1.0    Consumer Information Use and Disclaimer   This information is not specific medical advice and does not replace information you receive from your health care provider. This is only a brief summary of general information. It does NOT include all information about conditions, illnesses, injuries, tests, procedures, treatments, therapies, discharge instructions or life-style choices that may apply to you. You must talk with your health care provider for complete information about your health and treatment options. This information should not be used to decide whether or not to accept your health care provider's advice, instructions or recommendations. Only your health care provider has the knowledge and training to provide advice that is right for you. The use of this information is governed by the [employer] End User License  Agreement, available at https://www.[employer].com/en/solutions/lexicomp/about/annalisa.The use of [employer] content is governed by the [employer] Terms of Use. ©2021 UpToDate, Inc. All rights reserved.  Copyright   © 2021 UpToDate, Inc. and/or its affiliates. All rights reserved.    Patient Education       STD Prevention   About this topic   Sexually-transmitted diseases or STDs are infections you catch during sexual contact. This includes vaginal, oral, and anal sex. You may also get it by coming in contact with skin, genitals, mouth, rectum, or body fluids of an infected person. A person with an STD may not have any signs or know they have it. STDs can be very serious and have long-term health effects. STDs can cause cancer, blindness, or not being able to have children.  Bacteria, viruses, or parasites can cause STDs. Bacterial STDs are treated with antibiotics. Only the signs of viral STDs are treated. Common STDs include:  Chlamydia   Gonorrhea  Syphilis  Human immunodeficiency virus (HIV)  Herpes simplex  Human papillomavirus (HPV)  Hepatitis B and C  Trichomonas  STDs may cause signs like:  Pain when passing urine  Sores or genital warts  Unusual discharge from penis or vagina  Itching on your inner thighs, anus, or genitals  Abnormal menstrual bleeding  Pain or bleeding during sex  Swelling of testicles  Fever  Muscle or joint pain  These signs may come and go. It is important to see your doctor even if you think the signs are gone.  General   Learn about your health, your body, sex, love, and relationships. These are all important parts of sexual health.  Learn about STDs. Find reliable information about STDs.  Know the right names for both male and female body parts.  Find information about the kinds of infections you could get.  Know how STDs spread as well as the signs and treatment.     What will the results be?   You may be able to protect yourself from getting STDs.  You may be able to prevent long-term  effects on your health.  If you are pregnant, you may be able to prevent giving your unborn baby a STD.  Will there be any other care needed?   Ask your doctor if there are shots or pills you can take to prevent a STD.  Know your STD status. Get tested and have your partner tested.  What can be done to prevent this health problem?   The only sure way to keep from getting or passing on a sexually-transmitted infection is to not have sexual contact with anyone.  Even if you do not have any signs of illness, you may spread an STD.  Having an STD can increase your chances of catching HIV, the virus that causes AIDS.  If you have any type of sexual contact, use latex condoms each time to reduce the spread of infection. Use a condom with each partner every time, from the start of sex to the end.  Avoid contact with any sex partner known to have an infection or who has signs of an infection like genital sores or warts.  Avoid multiple sex partners. A long-term monogamous relationship with a partner who has tested negative and is known to have no infection is the safest partner.  If you are pregnant, get tested and get prompt treatment for an STD. This will help avoid passing it to your baby.  Avoid using drugs or too much alcohol. Either of these can lead to risky behavior like unprotected sex.  Talk to your partner about STDs before you have sex. Talk about having safe sex and if your relationship is monogamous.  Wash your hands and genitals with soap and water after sex.  See your doctor for regular exams and check-ups for STDs.  Talk to your doctor about available vaccines for prevention of certain STDs.  Where can I learn more?   American Academy of Family Physicians  http://familydoctor.org/familydoctor/en/diseases-conditions/sexually-transmitted-infections/prevention.printerview.all.html   Centers for Disease Control  http://www.cdc.gov/std/prevention/default.htm   Last Reviewed Date   2021-03-31  Consumer  Information Use and Disclaimer   This information is not specific medical advice and does not replace information you receive from your health care provider. This is only a brief summary of general information. It does NOT include all information about conditions, illnesses, injuries, tests, procedures, treatments, therapies, discharge instructions or life-style choices that may apply to you. You must talk with your health care provider for complete information about your health and treatment options. This information should not be used to decide whether or not to accept your health care providers advice, instructions or recommendations. Only your health care provider has the knowledge and training to provide advice that is right for you.  Copyright   Copyright © 2021 [employer] and its affiliates and/or licensors. All rights reserved.

## 2024-11-21 LAB — 25(OH)D3 SERPL-MCNC: 21 NG/ML

## 2024-12-08 ENCOUNTER — TELEPHONE (OUTPATIENT)
Dept: PRIMARY CARE CLINIC | Facility: CLINIC | Age: 24
End: 2024-12-08
Payer: MEDICAID

## 2024-12-08 DIAGNOSIS — E55.9 VITAMIN D DEFICIENCY: Primary | ICD-10-CM

## 2024-12-08 RX ORDER — ASPIRIN 325 MG
50000 TABLET, DELAYED RELEASE (ENTERIC COATED) ORAL WEEKLY
Qty: 12 CAPSULE | Refills: 0 | Status: SHIPPED | OUTPATIENT
Start: 2024-12-08 | End: 2025-02-24

## 2024-12-09 ENCOUNTER — PATIENT MESSAGE (OUTPATIENT)
Dept: PRIMARY CARE CLINIC | Facility: CLINIC | Age: 24
End: 2024-12-09
Payer: MEDICAID

## 2024-12-09 ENCOUNTER — TELEPHONE (OUTPATIENT)
Dept: PRIMARY CARE CLINIC | Facility: CLINIC | Age: 24
End: 2024-12-09
Payer: MEDICAID

## 2024-12-11 LAB
APTIMA MEDIA TYPE: NORMAL
C. TRACHOMATIS DNA PROBE RESULT:: NEGATIVE
N. GONORRHOEAE DNA PROBE RESULT:: NEGATIVE
SPECIMEN SOURCE: NORMAL

## 2025-01-14 ENCOUNTER — OFFICE VISIT (OUTPATIENT)
Dept: OBSTETRICS AND GYNECOLOGY | Facility: CLINIC | Age: 25
End: 2025-01-14
Payer: MEDICAID

## 2025-01-14 VITALS
BODY MASS INDEX: 33.45 KG/M2 | DIASTOLIC BLOOD PRESSURE: 82 MMHG | HEART RATE: 69 BPM | SYSTOLIC BLOOD PRESSURE: 121 MMHG | WEIGHT: 201 LBS

## 2025-01-14 DIAGNOSIS — Z01.419 ENCOUNTER FOR WELL WOMAN EXAM WITH ROUTINE GYNECOLOGICAL EXAM: Primary | ICD-10-CM

## 2025-01-14 PROCEDURE — 3079F DIAST BP 80-89 MM HG: CPT | Mod: CPTII,,, | Performed by: OBSTETRICS & GYNECOLOGY

## 2025-01-14 PROCEDURE — 99395 PREV VISIT EST AGE 18-39: CPT | Mod: S$PBB,,, | Performed by: OBSTETRICS & GYNECOLOGY

## 2025-01-14 PROCEDURE — 3008F BODY MASS INDEX DOCD: CPT | Mod: CPTII,,, | Performed by: OBSTETRICS & GYNECOLOGY

## 2025-01-14 PROCEDURE — 3074F SYST BP LT 130 MM HG: CPT | Mod: CPTII,,, | Performed by: OBSTETRICS & GYNECOLOGY

## 2025-01-14 NOTE — PROGRESS NOTES
Subjective:       Patient ID: Luciana Jacob is a 24 y.o. female.    Chief Complaint:  Well Woman and Annual Exam      History of Present Illness  Pt here for gyn annual.  History and past labs reviewed with patient.    Complaints none      Review of Systems  Review of Systems   Constitutional:  Negative for chills and fever.   Respiratory:  Negative for shortness of breath.    Cardiovascular:  Negative for chest pain.   Gastrointestinal:  Negative for abdominal pain, blood in stool, constipation, diarrhea, nausea, vomiting and reflux.   Genitourinary:  Negative for dysmenorrhea, dyspareunia, dysuria, hematuria, hot flashes, menorrhagia, menstrual problem, pelvic pain, vaginal bleeding, vaginal discharge, postcoital bleeding and vaginal dryness.   Musculoskeletal:  Negative for arthralgias and joint swelling.   Integumentary:  Negative for rash, hair changes, breast mass, nipple discharge and breast skin changes.   Psychiatric/Behavioral:  Negative for depression. The patient is not nervous/anxious.    Breast: Negative for asymmetry, lump, mass, nipple discharge and skin changes          Objective:     Vitals:    01/14/25 1403   BP: 121/82   Pulse: 69   Weight: 91.2 kg (201 lb)      Female chaperone present   Physical Exam:   Constitutional: She appears well-developed and well-nourished. No distress.    HENT:   Head: Normocephalic and atraumatic.    Eyes: Conjunctivae and EOM are normal.    Neck: No tracheal deviation present. No thyromegaly present.    Cardiovascular:       Exam reveals no clubbing, no cyanosis and no edema.        Pulmonary/Chest: Effort normal. No respiratory distress.        Abdominal: Soft. She exhibits no distension and no mass. There is no abdominal tenderness. There is no rebound and no guarding. No hernia.     Genitourinary:    Vagina, uterus and rectum normal.      Pelvic exam was performed with patient supine.   There is no rash, tenderness, lesion or injury on the right labia.  There is no rash, tenderness, lesion or injury on the left labia. Cervix is normal. Right adnexum displays no mass, no tenderness and no fullness. Left adnexum displays no mass, no tenderness and no fullness.                Skin: She is not diaphoretic. No cyanosis. Nails show no clubbing.            Assessment:        1. Encounter for well woman exam with routine gynecological exam                Plan:      Pap  Has nexplanon

## 2025-01-16 LAB
CHLAMYDIA: NEGATIVE
GONORRHEA: NEGATIVE
SOURCE: NORMAL
SOURCE: NORMAL
TRICHOMONAS AMPLIFIED: NEGATIVE

## 2025-05-05 ENCOUNTER — OFFICE VISIT (OUTPATIENT)
Dept: PRIMARY CARE CLINIC | Facility: CLINIC | Age: 25
End: 2025-05-05

## 2025-05-05 VITALS
SYSTOLIC BLOOD PRESSURE: 135 MMHG | DIASTOLIC BLOOD PRESSURE: 84 MMHG | HEIGHT: 65 IN | HEART RATE: 88 BPM | WEIGHT: 203.38 LBS | RESPIRATION RATE: 18 BRPM | BODY MASS INDEX: 33.88 KG/M2 | OXYGEN SATURATION: 99 %

## 2025-05-05 DIAGNOSIS — R82.90 ABNORMAL URINE ODOR: ICD-10-CM

## 2025-05-05 DIAGNOSIS — N30.01 ACUTE CYSTITIS WITH HEMATURIA: Primary | ICD-10-CM

## 2025-05-05 DIAGNOSIS — R30.0 DYSURIA: ICD-10-CM

## 2025-05-05 LAB
BILIRUBIN, UA POC OHS: NEGATIVE
BLOOD, UA POC OHS: ABNORMAL
CLARITY, UA POC OHS: CLEAR
COLOR, UA POC OHS: YELLOW
GLUCOSE, UA POC OHS: NEGATIVE
KETONES, UA POC OHS: NEGATIVE
LEUKOCYTES, UA POC OHS: ABNORMAL
NITRITE, UA POC OHS: NEGATIVE
PH, UA POC OHS: 7
PROTEIN, UA POC OHS: NEGATIVE
SPECIFIC GRAVITY, UA POC OHS: >=1.03
UROBILINOGEN, UA POC OHS: 1

## 2025-05-05 PROCEDURE — 99213 OFFICE O/P EST LOW 20 MIN: CPT | Mod: S$PBB,,, | Performed by: NURSE PRACTITIONER

## 2025-05-05 RX ORDER — CEPHALEXIN 500 MG/1
CAPSULE ORAL
COMMUNITY
Start: 2025-03-28 | End: 2025-05-05

## 2025-05-05 RX ORDER — NITROFURANTOIN 25; 75 MG/1; MG/1
100 CAPSULE ORAL 2 TIMES DAILY
Qty: 10 CAPSULE | Refills: 0 | Status: SHIPPED | OUTPATIENT
Start: 2025-05-05 | End: 2025-05-10

## 2025-05-05 NOTE — PROGRESS NOTES
Subjective:       Patient ID: Luciana Jacob is a 24 y.o. female.    Chief Complaint: Urinary Tract Infection (Pt complains of urine odor.)    Anjana is a  25 y/o female with complaints of strong urine odor. Reports she was treated for a UTI in March with Keflex but her urine still smells strong and she also has some urgency. POCT urine done today during visit shows leukocytes and blood.    She denies any other issues or concerns today.            History reviewed. No pertinent past medical history.    Past Surgical History:   Procedure Laterality Date    KNEE SURGERY      KNEE SURGERY Left 2017       Family History   Problem Relation Name Age of Onset    Pancreatic cancer Paternal Grandmother      Hypertension Father         Social History[1]    Problem List[2]    Immunization History   Administered Date(s) Administered    COVID-19, MRNA, LN-S, PF (Pfizer) (Purple Cap) 08/03/2021, 08/24/2021    DTaP 2002, 01/08/2003, 03/26/2003, 07/07/2004    DTaP / Hep B / IPV 11/15/2006    HPV 9-Valent 02/02/2017, 11/06/2018    Hep B / HiB 2002, 03/26/2003    Hepatitis A, Pediatric/Adolescent, 2 Dose 11/15/2006, 02/02/2017    Hepatitis B, Pediatric/Adolescent 2002    HiB PRP-OMP 11/15/2006    Hib-HbOC 01/08/2003, 07/07/2004    IPV 2002, 01/08/2003, 03/26/2003    Influenza 09/16/2022    Influenza - Intranasal 11/29/2010, 01/31/2013    Influenza - Quadrivalent - PF *Preferred* (6 months and older) 12/01/2004, 02/25/2008    MMR 01/07/2004    MMRV 11/15/2006    Meningococcal B, recombinant 11/05/2020    Meningococcal Conjugate (MCV4P) 10/10/2013, 11/06/2018    Pneumococcal Conjugate - 7 Valent 2002, 01/08/2003, 06/11/2003, 12/01/2004, 11/15/2006    Tdap 10/10/2013, 09/08/2022    Varicella 01/07/2004           Review of Systems   Constitutional:  Negative for activity change, appetite change, chills, diaphoresis, fatigue and fever.   Eyes:  Negative for visual disturbance.   Respiratory:  Negative  "for cough, chest tightness, shortness of breath and wheezing.    Cardiovascular:  Negative for leg swelling.   Gastrointestinal:  Negative for abdominal distention, abdominal pain, blood in stool, constipation, diarrhea, nausea and vomiting.   Genitourinary:  Positive for frequency and urgency. Negative for decreased urine volume, dysuria, flank pain, hematuria, menstrual problem, pelvic pain, vaginal bleeding, vaginal discharge and vaginal pain.        Foul smelling urine   Musculoskeletal:  Negative for gait problem.   Skin:  Negative for color change and rash.   Neurological:  Negative for dizziness, light-headedness and headaches.   Psychiatric/Behavioral:  Negative for behavioral problems and confusion.      Objective:     Vitals:    05/05/25 1453   BP: 135/84   BP Location: Right arm   Patient Position: Sitting   Pulse: 88   Resp: 18   SpO2: 99%   Weight: 92.3 kg (203 lb 6.4 oz)   Height: 5' 5" (1.651 m)       Physical Exam  Vitals and nursing note reviewed.   Constitutional:       General: She is not in acute distress.     Appearance: Normal appearance. She is not diaphoretic.   HENT:      Head: Normocephalic.      Mouth/Throat:      Mouth: Mucous membranes are moist.      Pharynx: Oropharynx is clear.   Eyes:      Conjunctiva/sclera: Conjunctivae normal.   Cardiovascular:      Rate and Rhythm: Normal rate and regular rhythm.   Pulmonary:      Effort: Pulmonary effort is normal.      Breath sounds: Normal breath sounds. No stridor. No wheezing or rales.   Abdominal:      General: There is no distension.      Tenderness: There is no abdominal tenderness.   Musculoskeletal:         General: No tenderness. Normal range of motion.      Cervical back: Normal range of motion.   Lymphadenopathy:      Cervical: No cervical adenopathy.   Skin:     General: Skin is warm and dry.   Neurological:      Mental Status: She is alert and oriented to person, place, and time.   Psychiatric:         Mood and Affect: Mood normal. "         Behavior: Behavior normal. Behavior is cooperative.         Office Visit on 05/05/2025   Component Date Value Ref Range Status    Color, POC UA 05/05/2025 Yellow  Yellow, Straw, Colorless Final    Clarity, POC UA 05/05/2025 Clear  Clear Final    Glucose, POC UA 05/05/2025 Negative  Negative Final    Bilirubin, POC UA 05/05/2025 Negative  Negative Final    Ketones, POC UA 05/05/2025 Negative  Negative Final    Spec Grav POC UA 05/05/2025 >=1.030  1.005 - 1.030 Final    Blood, POC UA 05/05/2025 Small (A)  Negative Final    pH, POC UA 05/05/2025 7.0  5.0 - 8.0 Final    Protein, POC UA 05/05/2025 Negative  Negative Final    Urobilinogen, POC UA 05/05/2025 1.0  <=1.0 Final    Nitrite, POC UA 05/05/2025 Negative  Negative Final    WBC, POC UA 05/05/2025 Small (A)  Negative Final   Office Visit on 01/14/2025   Component Date Value Ref Range Status    Source 01/14/2025 SWAB   Final    TRICHOMONAS AMPLIFIED 01/14/2025 NEGATIVE  NEGATIVE Final    Source 01/14/2025 SWAB   Final    Chlamydia 01/14/2025 NEGATIVE  NEGATIVE Final    Gonorrhea 01/14/2025 NEGATIVE  NEGATIVE Final   Orders Only on 11/20/2024   Component Date Value Ref Range Status    Aptima Media Type 11/20/2024 URINE   Final    Specimen Source 11/20/2024 URINE   Final    C. Trachomatis DNA Probe Result: 11/20/2024 NEGATIVE  Negative Final    N. Gonorrohoae DNA Probe Result:PA* 11/20/2024 NEGATIVE  Negative Final   Orders Only on 11/20/2024   Component Date Value Ref Range Status    Vit D, 25-Hydroxy 11/20/2024 21 (L)  >30 ng/mL Final   Orders Only on 11/20/2024   Component Date Value Ref Range Status    HCG, Quantitative 11/20/2024 < 1  mIU/mL Final   Orders Only on 11/20/2024   Component Date Value Ref Range Status    Sodium 11/20/2024 139  131 - 143 mmol/L Final    Potassium 11/20/2024 3.9  3.5 - 5.1 mmol/L Final    Chloride 11/20/2024 105  98 - 107 mmol/L Final    CO2 11/20/2024 31  21 - 32 mmol/L Final    Anion Gap 11/20/2024 3.0  3.0 - 11.0 mmol/L Final     BUN 11/20/2024 10.0  7.0 - 18.0 mg/dL Final    Creatinine 11/20/2024 0.71  0.55 - 1.02 mg/dL Final    GFR ESTIMATION 11/20/2024 123  >60 Final    BUN/Creatinine Ratio 11/20/2024 14.08  Ratio Final    Glucose 11/20/2024 88  74 - 106 mg/dL Final    Calcium 11/20/2024 9.4  8.5 - 10.1 mg/dL Final    Total Bilirubin 11/20/2024 0.5  0.2 - 1.0 mg/dL Final    AST 11/20/2024 12 (L)  15 - 37 U/L Final    ALT 11/20/2024 14  13 - 56 U/L Final    Total Protein 11/20/2024 8.1  6.4 - 8.2 g/dL Final    Albumin 11/20/2024 4.3  3.4 - 5.0 g/dL Final    Globulin 11/20/2024 3.8 (H)  2.3 - 3.5 g/dL Final    Albumin/Globulin Ratio 11/20/2024 1.1  1.1 - 1.8 Ratio Final    Alkaline Phosphatase 11/20/2024 78  45 - 117 U/L Final   Orders Only on 11/20/2024   Component Date Value Ref Range Status    WBC 11/20/2024 9.5  4.5 - 10.0 10*3/uL Final    RBC 11/20/2024 4.53  4.20 - 5.40 10*6/uL Final    Hemoglobin 11/20/2024 13.6  12.0 - 16.0 g/dL Final    Hematocrit 11/20/2024 41.6  37.0 - 47.0 % Final    MCV 11/20/2024 91.8  80.0 - 99.0 fL Final    MCH 11/20/2024 30.0  27.0 - 32.0 pg Final    MCHC 11/20/2024 32.7  32.0 - 36.0 % Final    RDW RBC Auto-Rto 11/20/2024 11.9  0.0 - 15.5 % Final    Platelets 11/20/2024 289  130 - 400 10*3/uL Final    Neutrophils 11/20/2024 74.6  50 - 80 % Final    Lymphocytes 11/20/2024 19.2 (L)  20.0 - 45.0 % Final    Monocytes 11/20/2024 5.1  2 - 10 % Final    Eosinophils 11/20/2024 0.6  0 - 6 % Final    Basophils 11/20/2024 0.2  0 - 3 % Final    nRBC# 11/20/2024 0.0  0 - 0.2 % Final    Neutrophils Absolute 11/20/2024 7.1 (H)  1.4 - 7.0 10*3/uL Final   Orders Only on 11/20/2024   Component Date Value Ref Range Status    Color, UA 11/20/2024 Yellow  Yellow Final    Appearance, UA 11/20/2024 Turbid (A)  Clear Final    pH, UA 11/20/2024 6.0  5.0 - 8.0 pH Final    Specific [place], UA 11/20/2024 1.023  1.005 - 1.030 Final    Protein, UA 11/20/2024 Negative  Negative mg/dL Final    Glucose, UA 11/20/2024 Negative  Normal  mg/dL Final    Ketones, UA 11/20/2024 Negative  Negative mg/dL Final    Occult Blood UA 11/20/2024 Negative  Negative /uL Final    Nitrite, UA 11/20/2024 Negative  Negative Final    Bilirubin (UA) 11/20/2024 Negative  Negative mg/dL Final    Urobilinogen, UA 11/20/2024 2.0  Normal mg/dL Final    Leukocytes, UA 11/20/2024 25 (A)  Negative /uL Final         Assessment:      1. Acute cystitis with hematuria    2. Abnormal urine odor    3. Dysuria          Plan:     Acute cystitis with hematuria  Comments:  Take all of the prescribed antibiotics and drink plenty of water  Orders:  -     nitrofurantoin, macrocrystal-monohydrate, (MACROBID) 100 MG capsule; Take 1 capsule (100 mg total) by mouth 2 (two) times daily. for 5 days  Dispense: 10 capsule; Refill: 0    Abnormal urine odor    Dysuria  -     POCT Urinalysis(Instrument)    Other orders  -     Lab Report         Current Medications[3]    Medications Discontinued During This Encounter   Medication Reason    cephALEXin (KEFLEX) 500 MG capsule Patient no longer taking       Health Maintenance   Topic Date Due    COVID-19 Vaccine (3 - 2024-25 season) 09/01/2024    Influenza Vaccine (Season Ended) 09/01/2025    Chlamydia Screening  01/14/2026    Pap Smear  01/09/2027    TETANUS VACCINE  09/08/2032    RSV Vaccine (Age 60+ and Pregnant patients) (1 - 1-dose 75+ series) 09/05/2077    Hepatitis C Screening  Completed    HIV Screening  Completed    Lipid Panel  Completed    HPV Vaccines  Completed    Pneumococcal Vaccines (Age 0-49)  Aged Out       Patient Instructions   RTC in 2 months for F/U or sooner if needed.    Patient Education       Acute Cystitis Discharge Instructions   About this topic   Acute cystitis is irritation of the bladder. It is often caused by germs getting into the urinary tract. The urinary tract includes the kidneys, ureters, bladder, and urethra. The urethra is a tube at the bottom of the bladder. Urine flows out of this tube. The germs enter the  urethra and then spread in the bladder. These germs may cause an infection in the bladder or urinary tract. This condition may also be caused by irritation from things like bubble baths, sanitary pads, sex, certain drugs, or certain foods. This condition is more common if you are pregnant.         What care is needed at home?   Ask your doctor what you need to do when you go home. Make sure you ask questions if you do not understand what the doctor says.  For the first day or so, you may want to take an over-the-counter medicine, like phenazopyridine. This will help to numb your bladder. You will also not have the strong urge to urinate. This medicine causes your urine and tears to look orange. If you have kidney disease, talk to your doctor before taking this medicine.  To lower your chance of getting a UTI in the future, you can:  Drink extra fluids.  If you have sex, urinate right afterwards.  Apply a warm compress or warm water bottle to your lower belly to lessen pain.  Practice good hygiene. Wipe from front to back after going to the toilet.  Do not use scented tampons, soap, or toilet paper.  Keep your genital area clean. Wash daily with soap and water.  Take showers instead of tub baths.  Do not use douches or genital hygiene sprays.  What follow-up care is needed?   Your doctor may ask you to make visits to the office to check on your progress. Be sure to keep these visits.  What drugs may be needed?   The doctor may order drugs to:  Help with pain or swelling  Help with the urge or need to pass urine often  Fight an infection  Will physical activity be limited?   Physical activities will not be limited. You may have to pass urine more often.  What changes to diet are needed?   Do not drink beer, wine, and mixed drinks (alcohol) or caffeine. These can bother the bladder.  Talk to your doctor about drinking cranberry juice or taking cranberry tablets.  What problems could happen?   Kidney infection  Blood  stream infection or sepsis  Blood in the urine  When do I need to call the doctor?   You have very bad pain in your back, shoulder, or belly.  You have a fever of 100.4°F (38°C) or higher; shaking chills or sweats even though you are taking antibiotics.  You notice more blood in your urine.  Your signs get worse or do not improve within 24 hours of starting treatment.  You are not able to urinate for more than 8 hours.  Your signs come back after treatment has stopped.  Teach Back: Helping You Understand   The Teach Back Method helps you understand the information we are giving you. After you talk with the staff, tell them in your own words what you learned. This helps to make sure the staff has described each thing clearly. It also helps to explain things that may have been confusing. Before going home, make sure you can do these:  I can tell you about my condition.  I can tell you what are good fluids for me to drink and how often I should try to go to the bathroom.  I can tell you what I will do if I have a fever; chills; pain with passing urine; blood in my urine; or back, side, or belly pain.  Where can I learn more?   Better Health Channel  https://www.betterhealth.[place]/health/ConditionsAndTreatments/cystitis   [facility] Choices  http://www.nhs.uk/conditions/Cystitis/Pages/Introduction.aspx   Last Reviewed Date   2021-06-09  Consumer Information Use and Disclaimer   This information is not specific medical advice and does not replace information you receive from your health care provider. This is only a brief summary of general information. It does NOT include all information about conditions, illnesses, injuries, tests, procedures, treatments, therapies, discharge instructions or life-style choices that may apply to you. You must talk with your health care provider for complete information about your health and treatment options. This information should not be used to decide whether or not to accept your  health care providers advice, instructions or recommendations. Only your health care provider has the knowledge and training to provide advice that is right for you.  Copyright   Copyright © 2021 [employer] and its affiliates and/or licensors. All rights reserved.      Risks, benefits, and alternatives discussed with patient, Patient verbalized understanding of discussed plan of care. Asked patient if any further questions, answered no.    Future Appointments   Date Time Provider Department Center   7/9/2025  1:20 PM Yane Leiva NP [facility] [id number] [facility]   1/27/2026  2:00 PM En Sparks MD [facility] [id number] [facility] NP         [1]   Social History  Tobacco Use    Smoking status: Never    Smokeless tobacco: Never   Substance Use Topics    Alcohol use: Not Currently    Drug use: Never   [2]   Patient Active Problem List  Diagnosis    Gastroesophageal reflux disease with esophagitis without hemorrhage   [3]   No current outpatient medications on file.     Current Facility-Administered Medications   Medication Dose Route Frequency Provider Last Rate Last Admin    etonogestreL subdermal device 68 mg  68 mg Implant  En Sparks MD   68 mg at 01/09/23 1300

## 2025-05-05 NOTE — PATIENT INSTRUCTIONS
RTC in 2 months for F/U or sooner if needed.    Patient Education       Acute Cystitis Discharge Instructions   About this topic   Acute cystitis is irritation of the bladder. It is often caused by germs getting into the urinary tract. The urinary tract includes the kidneys, ureters, bladder, and urethra. The urethra is a tube at the bottom of the bladder. Urine flows out of this tube. The germs enter the urethra and then spread in the bladder. These germs may cause an infection in the bladder or urinary tract. This condition may also be caused by irritation from things like bubble baths, sanitary pads, sex, certain drugs, or certain foods. This condition is more common if you are pregnant.         What care is needed at home?   Ask your doctor what you need to do when you go home. Make sure you ask questions if you do not understand what the doctor says.  For the first day or so, you may want to take an over-the-counter medicine, like phenazopyridine. This will help to numb your bladder. You will also not have the strong urge to urinate. This medicine causes your urine and tears to look orange. If you have kidney disease, talk to your doctor before taking this medicine.  To lower your chance of getting a UTI in the future, you can:  Drink extra fluids.  If you have sex, urinate right afterwards.  Apply a warm compress or warm water bottle to your lower belly to lessen pain.  Practice good hygiene. Wipe from front to back after going to the toilet.  Do not use scented tampons, soap, or toilet paper.  Keep your genital area clean. Wash daily with soap and water.  Take showers instead of tub baths.  Do not use douches or genital hygiene sprays.  What follow-up care is needed?   Your doctor may ask you to make visits to the office to check on your progress. Be sure to keep these visits.  What drugs may be needed?   The doctor may order drugs to:  Help with pain or swelling  Help with the urge or need to pass urine  often  Fight an infection  Will physical activity be limited?   Physical activities will not be limited. You may have to pass urine more often.  What changes to diet are needed?   Do not drink beer, wine, and mixed drinks (alcohol) or caffeine. These can bother the bladder.  Talk to your doctor about drinking cranberry juice or taking cranberry tablets.  What problems could happen?   Kidney infection  Blood stream infection or sepsis  Blood in the urine  When do I need to call the doctor?   You have very bad pain in your back, shoulder, or belly.  You have a fever of 100.4°F (38°C) or higher; shaking chills or sweats even though you are taking antibiotics.  You notice more blood in your urine.  Your signs get worse or do not improve within 24 hours of starting treatment.  You are not able to urinate for more than 8 hours.  Your signs come back after treatment has stopped.  Teach Back: Helping You Understand   The Teach Back Method helps you understand the information we are giving you. After you talk with the staff, tell them in your own words what you learned. This helps to make sure the staff has described each thing clearly. It also helps to explain things that may have been confusing. Before going home, make sure you can do these:  I can tell you about my condition.  I can tell you what are good fluids for me to drink and how often I should try to go to the bathroom.  I can tell you what I will do if I have a fever; chills; pain with passing urine; blood in my urine; or back, side, or belly pain.  Where can I learn more?   Better Health Channel  https://www.betterhealth.[place].gov.[place]/health/ConditionsAndTreatments/cystitis   NHS Choices  http://www.nhs.uk/conditions/Cystitis/Pages/Introduction.aspx   Last Reviewed Date   2021-06-09  Consumer Information Use and Disclaimer   This information is not specific medical advice and does not replace information you receive from your health care provider. This is only a  brief summary of general information. It does NOT include all information about conditions, illnesses, injuries, tests, procedures, treatments, therapies, discharge instructions or life-style choices that may apply to you. You must talk with your health care provider for complete information about your health and treatment options. This information should not be used to decide whether or not to accept your health care providers advice, instructions or recommendations. Only your health care provider has the knowledge and training to provide advice that is right for you.  Copyright   Copyright © 2021 [employer] and its affiliates and/or licensors. All rights reserved.